# Patient Record
Sex: FEMALE | Race: WHITE | Employment: FULL TIME | URBAN - METROPOLITAN AREA
[De-identification: names, ages, dates, MRNs, and addresses within clinical notes are randomized per-mention and may not be internally consistent; named-entity substitution may affect disease eponyms.]

---

## 2019-04-19 ENCOUNTER — HOSPITAL ENCOUNTER (EMERGENCY)
Age: 54
Discharge: HOME | End: 2019-04-20
Attending: EMERGENCY MEDICINE
Payer: SELF-PAY

## 2019-04-19 DIAGNOSIS — R31.9 HEMATURIA, UNSPECIFIED TYPE: ICD-10-CM

## 2019-04-19 DIAGNOSIS — R10.11 ABDOMINAL PAIN, RIGHT UPPER QUADRANT: ICD-10-CM

## 2019-04-19 DIAGNOSIS — R10.9 FLANK PAIN: Primary | ICD-10-CM

## 2019-04-19 LAB
ALBUMIN SERPL-MCNC: 3.7 G/DL (ref 3.5–5)
ALBUMIN/GLOB SERPL: 1 {RATIO}
ALP SERPL-CCNC: 64 U/L (ref 50–130)
ALT SERPL-CCNC: 16 U/L (ref 12–65)
ANION GAP SERPL CALC-SCNC: 8 MMOL/L
APPEARANCE UR: CLEAR
AST SERPL-CCNC: 15 U/L (ref 15–37)
BACTERIA URNS QL MICRO: 0 /HPF
BASOPHILS # BLD: 0 K/UL (ref 0–0.2)
BASOPHILS NFR BLD: 0 % (ref 0–2)
BILIRUB SERPL-MCNC: 0.7 MG/DL (ref 0.2–1.1)
BILIRUB UR QL: ABNORMAL
BUN SERPL-MCNC: 13 MG/DL (ref 6–23)
CALCIUM SERPL-MCNC: 9 MG/DL (ref 8.3–10.4)
CHLORIDE SERPL-SCNC: 105 MMOL/L (ref 98–107)
CO2 SERPL-SCNC: 24 MMOL/L (ref 21–32)
COLOR UR: YELLOW
CREAT SERPL-MCNC: 0.91 MG/DL (ref 0.6–1)
DIFFERENTIAL METHOD BLD: ABNORMAL
EOSINOPHIL # BLD: 0 K/UL (ref 0–0.8)
EOSINOPHIL NFR BLD: 0 % (ref 0.5–7.8)
EPI CELLS #/AREA URNS HPF: ABNORMAL /HPF
ERYTHROCYTE [DISTWIDTH] IN BLOOD BY AUTOMATED COUNT: 13 % (ref 11.9–14.6)
GLOBULIN SER CALC-MCNC: 3.6 G/DL (ref 2.3–3.5)
GLUCOSE SERPL-MCNC: 102 MG/DL (ref 65–100)
GLUCOSE UR STRIP.AUTO-MCNC: NEGATIVE MG/DL
HCT VFR BLD AUTO: 45 % (ref 35.8–46.3)
HGB BLD-MCNC: 15.2 G/DL (ref 11.7–15.4)
HGB UR QL STRIP: ABNORMAL
IMM GRANULOCYTES # BLD AUTO: 0 K/UL (ref 0–0.5)
IMM GRANULOCYTES NFR BLD AUTO: 0 % (ref 0–5)
KETONES UR QL STRIP.AUTO: 15 MG/DL
LEUKOCYTE ESTERASE UR QL STRIP.AUTO: NEGATIVE
LIPASE SERPL-CCNC: 113 U/L (ref 73–393)
LYMPHOCYTES # BLD: 1.6 K/UL (ref 0.5–4.6)
LYMPHOCYTES NFR BLD: 16 % (ref 13–44)
MCH RBC QN AUTO: 30.6 PG (ref 26.1–32.9)
MCHC RBC AUTO-ENTMCNC: 33.8 G/DL (ref 31.4–35)
MCV RBC AUTO: 90.5 FL (ref 79.6–97.8)
MONOCYTES # BLD: 1 K/UL (ref 0.1–1.3)
MONOCYTES NFR BLD: 10 % (ref 4–12)
NEUTS SEG # BLD: 7.3 K/UL (ref 1.7–8.2)
NEUTS SEG NFR BLD: 73 % (ref 43–78)
NITRITE UR QL STRIP.AUTO: NEGATIVE
NRBC # BLD: 0 K/UL (ref 0–0.2)
PH UR STRIP: 5.5 [PH] (ref 5–9)
PLATELET # BLD AUTO: 295 K/UL (ref 150–450)
PMV BLD AUTO: 9.5 FL (ref 9.4–12.3)
POTASSIUM SERPL-SCNC: 4 MMOL/L (ref 3.5–5.1)
PROT SERPL-MCNC: 7.3 G/DL
PROT UR STRIP-MCNC: 30 MG/DL
RBC # BLD AUTO: 4.97 M/UL (ref 4.05–5.2)
RBC #/AREA URNS HPF: ABNORMAL /HPF
SODIUM SERPL-SCNC: 137 MMOL/L (ref 136–145)
SP GR UR REFRACTOMETRY: 1.02 (ref 1–1.02)
UROBILINOGEN UR QL STRIP.AUTO: 0.2 EU/DL (ref 0.2–1)
WBC # BLD AUTO: 9.9 K/UL (ref 4.3–11.1)

## 2019-04-19 PROCEDURE — 93005 ELECTROCARDIOGRAM TRACING: CPT | Performed by: EMERGENCY MEDICINE

## 2019-04-19 PROCEDURE — 96374 THER/PROPH/DIAG INJ IV PUSH: CPT | Performed by: EMERGENCY MEDICINE

## 2019-04-19 PROCEDURE — 80053 COMPREHEN METABOLIC PANEL: CPT

## 2019-04-19 PROCEDURE — 83690 ASSAY OF LIPASE: CPT

## 2019-04-19 PROCEDURE — 74011250636 HC RX REV CODE- 250/636: Performed by: EMERGENCY MEDICINE

## 2019-04-19 PROCEDURE — 87086 URINE CULTURE/COLONY COUNT: CPT

## 2019-04-19 PROCEDURE — 96361 HYDRATE IV INFUSION ADD-ON: CPT | Performed by: EMERGENCY MEDICINE

## 2019-04-19 PROCEDURE — 81003 URINALYSIS AUTO W/O SCOPE: CPT | Performed by: EMERGENCY MEDICINE

## 2019-04-19 PROCEDURE — 99285 EMERGENCY DEPT VISIT HI MDM: CPT | Performed by: EMERGENCY MEDICINE

## 2019-04-19 PROCEDURE — 74011250637 HC RX REV CODE- 250/637: Performed by: EMERGENCY MEDICINE

## 2019-04-19 PROCEDURE — 87186 SC STD MICRODIL/AGAR DIL: CPT

## 2019-04-19 PROCEDURE — 87088 URINE BACTERIA CULTURE: CPT

## 2019-04-19 PROCEDURE — 81003 URINALYSIS AUTO W/O SCOPE: CPT

## 2019-04-19 PROCEDURE — 85025 COMPLETE CBC W/AUTO DIFF WBC: CPT

## 2019-04-19 RX ORDER — ACETAMINOPHEN 500 MG
1000 TABLET ORAL
Status: COMPLETED | OUTPATIENT
Start: 2019-04-19 | End: 2019-04-19

## 2019-04-19 RX ORDER — MORPHINE SULFATE 2 MG/ML
4 INJECTION, SOLUTION INTRAMUSCULAR; INTRAVENOUS ONCE
Status: COMPLETED | OUTPATIENT
Start: 2019-04-19 | End: 2019-04-19

## 2019-04-19 RX ADMIN — MORPHINE SULFATE 4 MG: 2 INJECTION, SOLUTION INTRAMUSCULAR; INTRAVENOUS at 23:29

## 2019-04-19 RX ADMIN — ACETAMINOPHEN 1000 MG: 500 TABLET, FILM COATED ORAL at 23:28

## 2019-04-19 RX ADMIN — SODIUM CHLORIDE 1000 ML: 900 INJECTION, SOLUTION INTRAVENOUS at 23:28

## 2019-04-19 NOTE — LETTER
[facility] EMERGENCY DEPT 
[address] [place] [id number] [phone] Work/School Note Date: 4/19/2019 To Whom It May concern: 
 
Bang Husbands was seen and treated today in the emergency room by the following provider(s): 
Attending Provider: Olena Lagunas MD. Bang Husbands may return to work on Tuesday 4/23/19 Sincerely, Lindsay Vásquez RN

## 2019-04-20 ENCOUNTER — APPOINTMENT (OUTPATIENT)
Dept: ULTRASOUND IMAGING | Age: 54
End: 2019-04-20
Attending: EMERGENCY MEDICINE
Payer: SELF-PAY

## 2019-04-20 ENCOUNTER — APPOINTMENT (OUTPATIENT)
Dept: CT IMAGING | Age: 54
End: 2019-04-20
Attending: EMERGENCY MEDICINE
Payer: SELF-PAY

## 2019-04-20 VITALS
HEIGHT: 65 IN | SYSTOLIC BLOOD PRESSURE: 100 MMHG | TEMPERATURE: 98.7 F | HEART RATE: 77 BPM | BODY MASS INDEX: 25.83 KG/M2 | WEIGHT: 155 LBS | OXYGEN SATURATION: 95 % | RESPIRATION RATE: 18 BRPM | DIASTOLIC BLOOD PRESSURE: 58 MMHG

## 2019-04-20 LAB
ATRIAL RATE: 92 BPM
CALCULATED P AXIS, ECG09: 78 DEGREES
CALCULATED R AXIS, ECG10: 72 DEGREES
CALCULATED T AXIS, ECG11: 85 DEGREES
DIAGNOSIS, 93000: NORMAL
P-R INTERVAL, ECG05: 132 MS
Q-T INTERVAL, ECG07: 326 MS
QRS DURATION, ECG06: 64 MS
QTC CALCULATION (BEZET), ECG08: 403 MS
VENTRICULAR RATE, ECG03: 92 BPM

## 2019-04-20 PROCEDURE — 76705 ECHO EXAM OF ABDOMEN: CPT

## 2019-04-20 PROCEDURE — 74011250636 HC RX REV CODE- 250/636: Performed by: EMERGENCY MEDICINE

## 2019-04-20 PROCEDURE — 96375 TX/PRO/DX INJ NEW DRUG ADDON: CPT | Performed by: EMERGENCY MEDICINE

## 2019-04-20 PROCEDURE — 96361 HYDRATE IV INFUSION ADD-ON: CPT | Performed by: EMERGENCY MEDICINE

## 2019-04-20 PROCEDURE — 74176 CT ABD & PELVIS W/O CONTRAST: CPT

## 2019-04-20 RX ORDER — OXYCODONE AND ACETAMINOPHEN 5; 325 MG/1; MG/1
1 TABLET ORAL
Qty: 20 TAB | Refills: 0 | Status: SHIPPED | OUTPATIENT
Start: 2019-04-20 | End: 2019-04-23

## 2019-04-20 RX ORDER — KETOROLAC TROMETHAMINE 30 MG/ML
30 INJECTION, SOLUTION INTRAMUSCULAR; INTRAVENOUS ONCE
Status: COMPLETED | OUTPATIENT
Start: 2019-04-20 | End: 2019-04-20

## 2019-04-20 RX ADMIN — KETOROLAC TROMETHAMINE 30 MG: 30 INJECTION, SOLUTION INTRAMUSCULAR at 01:01

## 2019-04-20 NOTE — ED TRIAGE NOTES
Pt states that she woke this am with severe right flank pain and pain into her esophagus area. Also, c/o nausea

## 2019-04-20 NOTE — DISCHARGE INSTRUCTIONS
Patient Education        Blood in the Urine: Care Instructions  Your Care Instructions    Blood in the urine, or hematuria, may make the urine look red, brown, or pink. There may be blood every time you urinate or just from time to time. You cannot always see blood in the urine, but it will show up in a urine test.  Blood in the urine may be serious. It should always be checked by a doctor. Your doctor may recommend more tests, including an X-ray, a CT scan, or a cystoscopy (which lets a doctor look inside the urethra and bladder). Blood in the urine can be a sign of another problem. Common causes are bladder infections and kidney stones. An injury to your groin or your genital area can also cause bleeding in the urinary tract. Very hard exercise--such as running a marathon--can cause blood in the urine. Blood in the urine can also be a sign of kidney disease or cancer in the bladder or kidney. Many cases of blood in the urine are caused by a harmless condition that runs in families. This is called benign familial hematuria. It does not need any treatment. Sometimes your urine may look red or brown even though it does not contain blood. For example, not getting enough fluids (dehydration), taking certain medicines, or having a liver problem can change the color of your urine. Eating foods such as beets, rhubarb, or blackberries or foods with red food coloring can make your urine look red or pink. Follow-up care is a key part of your treatment and safety. Be sure to make and go to all appointments, and call your doctor if you are having problems. It's also a good idea to know your test results and keep a list of the medicines you take. When should you call for help? Call your doctor now or seek immediate medical care if:    · You have symptoms of a urinary infection. For example:  ? You have pus in your urine. ? You have pain in your back just below your rib cage. This is called flank pain. ?  You have a fever, chills, or body aches. ? It hurts to urinate. ? You have groin or belly pain.     · You have more blood in your urine.    Watch closely for changes in your health, and be sure to contact your doctor if:    · You have new urination problems.     · You do not get better as expected. Where can you learn more? Go to http://jesica-sheree.info/. Enter T525 in the search box to learn more about \"Blood in the Urine: Care Instructions. \"  Current as of: March 20, 2018  Content Version: 11.9  © 5101-0211 [employer]. Care instructions adapted under license by [employer] (which disclaims liability or warranty for this information). If you have questions about a medical condition or this instruction, always ask your healthcare professional. [address] any warranty or liability for your use of this information. Use the pain medication as needed. Continue to monitor symptoms she develop high fevers, uncontrolled vomiting or pain please return for further evaluation. Patient Education        Abdominal Pain: Care Instructions  Your Care Instructions    Abdominal pain has many possible causes. Some aren't serious and get better on their own in a few days. Others need more testing and treatment. If your pain continues or gets worse, you need to be rechecked and may need more tests to find out what is wrong. You may need surgery to correct the problem. Don't ignore new symptoms, such as fever, nausea and vomiting, urination problems, pain that gets worse, and dizziness. These may be signs of a more serious problem. Your doctor may have recommended a follow-up visit in the next 8 to 12 hours. If you are not getting better, you may need more tests or treatment. The doctor has checked you carefully, but problems can develop later. If you notice any problems or new symptoms, get medical treatment right away.   Follow-up care is a key part of your treatment and safety. Be sure to make and go to all appointments, and call your doctor if you are having problems. It's also a good idea to know your test results and keep a list of the medicines you take. How can you care for yourself at home? · Rest until you feel better. · To prevent dehydration, drink plenty of fluids, enough so that your urine is light yellow or clear like water. Choose water and other caffeine-free clear liquids until you feel better. If you have kidney, heart, or liver disease and have to limit fluids, talk with your doctor before you increase the amount of fluids you drink. · If your stomach is upset, eat mild foods, such as rice, dry toast or crackers, bananas, and applesauce. Try eating several small meals instead of two or three large ones. · Wait until 48 hours after all symptoms have gone away before you have spicy foods, alcohol, and drinks that contain caffeine. · Do not eat foods that are high in fat. · Avoid anti-inflammatory medicines such as aspirin, ibuprofen (Advil, Motrin), and naproxen (Aleve). These can cause stomach upset. Talk to your doctor if you take daily aspirin for another health problem. When should you call for help? Call 911 anytime you think you may need emergency care. For example, call if:    · You passed out (lost consciousness).     · You pass maroon or very bloody stools.     · You vomit blood or what looks like coffee grounds.     · You have new, severe belly pain.    Call your doctor now or seek immediate medical care if:    · Your pain gets worse, especially if it becomes focused in one area of your belly.     · You have a new or higher fever.     · Your stools are black and look like tar, or they have streaks of blood.     · You have unexpected vaginal bleeding.     · You have symptoms of a urinary tract infection. These may include:  ? Pain when you urinate. ? Urinating more often than usual.  ?  Blood in your urine.     · You are dizzy or lightheaded, or you feel like you may faint.    Watch closely for changes in your health, and be sure to contact your doctor if:    · You are not getting better after 1 day (24 hours). Where can you learn more? Go to http://jesica-sheree.info/. Enter P470 in the search box to learn more about \"Abdominal Pain: Care Instructions. \"  Current as of: September 23, 2018  Content Version: 11.9  © 6075-5236 [employer]. Care instructions adapted under license by [employer] (which disclaims liability or warranty for this information). If you have questions about a medical condition or this instruction, always ask your healthcare professional. [address] any warranty or liability for your use of this information.

## 2019-04-20 NOTE — ED NOTES
I have reviewed discharge instructions with the patient. The patient verbalized understanding. Patient left ED via Discharge Method: ambulatory to Home with . Opportunity for questions and clarification provided. Patient given 1 scripts. To continue your aftercare when you leave the hospital, you may receive an automated call from our care team to check in on how you are doing. This is a free service and part of our promise to provide the best care and service to meet your aftercare needs.  If you have questions, or wish to unsubscribe from this service please call [phone]. Thank you for Choosing our [facility] Emergency Department.

## 2019-04-22 LAB
BACTERIA SPEC CULT: ABNORMAL
BACTERIA SPEC CULT: ABNORMAL
SERVICE CMNT-IMP: ABNORMAL

## 2019-04-22 RX ORDER — CIPROFLOXACIN 500 MG/1
500 TABLET ORAL 2 TIMES DAILY
Qty: 14 TAB | Refills: 0 | Status: SHIPPED | OUTPATIENT
Start: 2019-04-22 | End: 2019-04-29

## 2019-04-22 NOTE — PROGRESS NOTES
Spoke with patient who has a follow up with urology scheduled for Monday. She states she has continued to have pain. I have discussed need for antibiotics with her. She voiced understanding. Prescription for cipro sent to pharmacy of her choice.

## 2019-04-25 NOTE — ED PROVIDER NOTES
The history is provided by the patient. Epigastric Pain This is a new problem. The current episode started 12 to 24 hours ago. The problem occurs constantly. The problem has not changed since onset. The pain is associated with an unknown factor. The pain is located in the epigastric region. The quality of the pain is cramping, pressure-like and shooting. The pain is at a severity of 5/10. The pain is moderate. Associated symptoms include nausea. Pertinent negatives include no anorexia, no fever, no belching, no diarrhea, no flatus, no hematochezia, no melena, no vomiting, no constipation, no dysuria, no frequency, no hematuria, no headaches, no arthralgias, no myalgias, no trauma, no chest pain, no testicular pain and no back pain. Nothing worsens the pain. The pain is relieved by nothing. History reviewed. No pertinent past medical history. Past Surgical History:  
Procedure Laterality Date  HX BACK SURGERY    
 HX GYN    
 HX HYSTERECTOMY  HX ORTHOPAEDIC History reviewed. No pertinent family history. Social History Socioeconomic History  Marital status:  Spouse name: Not on file  Number of children: Not on file  Years of education: Not on file  Highest education level: Not on file Occupational History  Not on file Social Needs  Financial resource strain: Not on file  Food insecurity:  
  Worry: Not on file Inability: Not on file  Transportation needs:  
  Medical: Not on file Non-medical: Not on file Tobacco Use  Smoking status: Current Every Day Smoker  Smokeless tobacco: Never Used Substance and Sexual Activity  Alcohol use: Yes Comment: occassionally  Drug use: Not Currently  Sexual activity: Yes  
  Partners: Male Lifestyle  Physical activity:  
  Days per week: Not on file Minutes per session: Not on file  Stress: Not on file Relationships  Social connections: Talks on phone: Not on file Gets together: Not on file Attends Mandaeism service: Not on file Active member of club or organization: Not on file Attends meetings of clubs or organizations: Not on file Relationship status: Not on file  Intimate partner violence:  
  Fear of current or ex partner: Not on file Emotionally abused: Not on file Physically abused: Not on file Forced sexual activity: Not on file Other Topics Concern  Not on file Social History Narrative  Not on file ALLERGIES: Patient has no known allergies. Review of Systems Constitutional: Negative for fever. Cardiovascular: Negative for chest pain. Gastrointestinal: Positive for nausea. Negative for anorexia, constipation, diarrhea, flatus, hematochezia, melena and vomiting. Genitourinary: Negative for dysuria, frequency, hematuria and testicular pain. Musculoskeletal: Negative for arthralgias, back pain and myalgias. Neurological: Negative for headaches. All other systems reviewed and are negative. Vitals:  
 04/20/19 0000 04/20/19 0123 04/20/19 0124 04/20/19 0215 BP: 106/63 103/57  100/58 Pulse: 88  75 77 Resp: 17   18 Temp:    98.7 °F (37.1 °C) SpO2: 93%  93% 95% Weight:      
Height:      
      
 
Physical Exam  
Constitutional: She is oriented to person, place, and time. She appears well-developed and well-nourished. HENT:  
Head: Normocephalic and atraumatic. Eyes: Pupils are equal, round, and reactive to light. Conjunctivae are normal.  
Neck: Neck supple. Cardiovascular: Normal rate and regular rhythm. Pulmonary/Chest: Effort normal and breath sounds normal.  
Abdominal: Soft. Bowel sounds are normal. There is tenderness. Musculoskeletal: Normal range of motion. Neurological: She is alert and oriented to person, place, and time. Skin: Skin is warm and dry. Nursing note and vitals reviewed. MDM Number of Diagnoses or Management Options Abdominal pain, right upper quadrant:  
Flank pain:  
Hematuria, unspecified type:  
Diagnosis management comments: 19-year-old female presenting for epigastric pain. Differential includes cholelithiasis, cholecystitis, kidney stone, urinary tract infection, constipation, peptic ulcer disease Amount and/or Complexity of Data Reviewed Clinical lab tests: ordered and reviewed Tests in the radiology section of CPT®: ordered and reviewed Tests in the medicine section of CPT®: ordered and reviewed Independent visualization of images, tracings, or specimens: yes Risk of Complications, Morbidity, and/or Mortality Presenting problems: moderate Diagnostic procedures: moderate Management options: moderate General comments: I personally reviewed the patient's vital signs, laboratory tests, and/or radiological findings. I discussed these findings with the patient and their significance. I answered all questions and gave the patient clear return precautions. The patient was discharged from the emergency department in stable condition Patient Progress Patient progress: stable ED Course as of Apr 25 1438 Sat Apr 20, 2019  
0012 There is large blood and patient's urinalysis consistent with possible kidney stone. [JS] 0013 No bacteria, no leuk esterase and no nitrates [JS] ED Course User Index [JS] Abbey Hinson MD  
 
 
Procedures

## 2019-11-19 ENCOUNTER — APPOINTMENT (OUTPATIENT)
Dept: GENERAL RADIOLOGY | Age: 54
End: 2019-11-19
Attending: STUDENT IN AN ORGANIZED HEALTH CARE EDUCATION/TRAINING PROGRAM
Payer: COMMERCIAL

## 2019-11-19 ENCOUNTER — HOSPITAL ENCOUNTER (EMERGENCY)
Age: 54
Discharge: HOME | End: 2019-11-19
Attending: STUDENT IN AN ORGANIZED HEALTH CARE EDUCATION/TRAINING PROGRAM
Payer: COMMERCIAL

## 2019-11-19 VITALS
HEART RATE: 84 BPM | SYSTOLIC BLOOD PRESSURE: 106 MMHG | DIASTOLIC BLOOD PRESSURE: 76 MMHG | OXYGEN SATURATION: 98 % | TEMPERATURE: 98.4 F | RESPIRATION RATE: 18 BRPM

## 2019-11-19 DIAGNOSIS — J06.9 ACUTE UPPER RESPIRATORY INFECTION: Primary | ICD-10-CM

## 2019-11-19 DIAGNOSIS — M54.50 ACUTE MIDLINE LOW BACK PAIN WITHOUT SCIATICA: ICD-10-CM

## 2019-11-19 LAB
BACTERIA URNS QL MICRO: 0 /HPF
CASTS URNS QL MICRO: NORMAL /LPF
EPI CELLS #/AREA URNS HPF: NORMAL /HPF
RBC #/AREA URNS HPF: NORMAL /HPF
WBC URNS QL MICRO: NORMAL /HPF

## 2019-11-19 PROCEDURE — 81003 URINALYSIS AUTO W/O SCOPE: CPT | Performed by: STUDENT IN AN ORGANIZED HEALTH CARE EDUCATION/TRAINING PROGRAM

## 2019-11-19 PROCEDURE — 81015 MICROSCOPIC EXAM OF URINE: CPT

## 2019-11-19 PROCEDURE — 96372 THER/PROPH/DIAG INJ SC/IM: CPT | Performed by: STUDENT IN AN ORGANIZED HEALTH CARE EDUCATION/TRAINING PROGRAM

## 2019-11-19 PROCEDURE — 74011250636 HC RX REV CODE- 250/636: Performed by: STUDENT IN AN ORGANIZED HEALTH CARE EDUCATION/TRAINING PROGRAM

## 2019-11-19 PROCEDURE — 72100 X-RAY EXAM L-S SPINE 2/3 VWS: CPT

## 2019-11-19 PROCEDURE — 71046 X-RAY EXAM CHEST 2 VIEWS: CPT

## 2019-11-19 PROCEDURE — 74011250637 HC RX REV CODE- 250/637: Performed by: STUDENT IN AN ORGANIZED HEALTH CARE EDUCATION/TRAINING PROGRAM

## 2019-11-19 PROCEDURE — 99284 EMERGENCY DEPT VISIT MOD MDM: CPT | Performed by: STUDENT IN AN ORGANIZED HEALTH CARE EDUCATION/TRAINING PROGRAM

## 2019-11-19 RX ORDER — HYDROCODONE BITARTRATE AND ACETAMINOPHEN 7.5; 325 MG/1; MG/1
1 TABLET ORAL
Qty: 10 TAB | Refills: 0 | Status: SHIPPED | OUTPATIENT
Start: 2019-11-19 | End: 2019-11-23

## 2019-11-19 RX ORDER — DOXYCYCLINE HYCLATE 100 MG
100 TABLET ORAL 2 TIMES DAILY
Qty: 20 TAB | Refills: 0 | Status: SHIPPED | OUTPATIENT
Start: 2019-11-19 | End: 2019-11-29

## 2019-11-19 RX ORDER — DEXAMETHASONE 2 MG/1
TABLET ORAL
Qty: 4 TAB | Refills: 0 | Status: SHIPPED | OUTPATIENT
Start: 2019-11-19 | End: 2019-11-23

## 2019-11-19 RX ORDER — IBUPROFEN 800 MG/1
800 TABLET ORAL
Qty: 20 TAB | Refills: 0 | Status: SHIPPED | OUTPATIENT
Start: 2019-11-19 | End: 2019-11-26

## 2019-11-19 RX ORDER — HYDROMORPHONE HYDROCHLORIDE 1 MG/ML
1 INJECTION, SOLUTION INTRAMUSCULAR; INTRAVENOUS; SUBCUTANEOUS
Status: COMPLETED | OUTPATIENT
Start: 2019-11-19 | End: 2019-11-19

## 2019-11-19 RX ORDER — DEXAMETHASONE SODIUM PHOSPHATE 100 MG/10ML
10 INJECTION INTRAMUSCULAR; INTRAVENOUS
Status: COMPLETED | OUTPATIENT
Start: 2019-11-19 | End: 2019-11-19

## 2019-11-19 RX ORDER — ONDANSETRON 4 MG/1
4 TABLET, ORALLY DISINTEGRATING ORAL
Status: COMPLETED | OUTPATIENT
Start: 2019-11-19 | End: 2019-11-19

## 2019-11-19 RX ORDER — HYDROCODONE BITARTRATE AND ACETAMINOPHEN 7.5; 325 MG/1; MG/1
1 TABLET ORAL
Status: COMPLETED | OUTPATIENT
Start: 2019-11-19 | End: 2019-11-19

## 2019-11-19 RX ADMIN — DEXAMETHASONE SODIUM PHOSPHATE 10 MG: 10 INJECTION INTRAMUSCULAR; INTRAVENOUS at 21:31

## 2019-11-19 RX ADMIN — HYDROCODONE BITARTRATE AND ACETAMINOPHEN 1 TABLET: 7.5; 325 TABLET ORAL at 22:55

## 2019-11-19 RX ADMIN — HYDROMORPHONE HYDROCHLORIDE 1 MG: 1 INJECTION, SOLUTION INTRAMUSCULAR; INTRAVENOUS; SUBCUTANEOUS at 21:31

## 2019-11-19 RX ADMIN — ONDANSETRON 4 MG: 4 TABLET, ORALLY DISINTEGRATING ORAL at 21:31

## 2019-11-20 NOTE — ED NOTES
Patient reports some relief of discomfort, states pain still at 7 on 1-10 scale with movement. Otherwise, no further needs expressed or apparent.

## 2019-11-20 NOTE — ED PROVIDER NOTES
19-year-old female patient with a history of chronic back pain presents to the emergency department with reports of sudden pain probation. Patient states she is suffered from an upper respiratory infection/congestion for the past several days. This is made her start to cough. She states she coughed forcefully yesterday and had sudden onset of discomfort in her lower back. Pain is isolated to the lumbar spine and does not radiate. She denies tingling, numbness or weakness. Her movement is limited significantly secondary to pain. She reports normal bowel and bladder habits however. No associated fever or chills. Denies known sick contacts.            Past Medical History:   Diagnosis Date    Kidney stone        Past Surgical History:   Procedure Laterality Date    HX BACK SURGERY      HX GYN      HX HYSTERECTOMY      HX ORTHOPAEDIC           Family History:   Problem Relation Age of Onset    Cancer Mother     Cancer Father        Social History     Socioeconomic History    Marital status:      Spouse name: Not on file    Number of children: Not on file    Years of education: Not on file    Highest education level: Not on file   Occupational History    Not on file   Social Needs    Financial resource strain: Not on file    Food insecurity:     Worry: Not on file     Inability: Not on file    Transportation needs:     Medical: Not on file     Non-medical: Not on file   Tobacco Use    Smoking status: Current Every Day Smoker     Packs/day: 0.50    Smokeless tobacco: Never Used   Substance and Sexual Activity    Alcohol use: Not Currently     Comment: occassionally    Drug use: Not Currently    Sexual activity: Yes     Partners: Male   Lifestyle    Physical activity:     Days per week: Not on file     Minutes per session: Not on file    Stress: Not on file   Relationships    Social connections:     Talks on phone: Not on file     Gets together: Not on file     Attends Sikhism service: Not on file     Active member of club or organization: Not on file     Attends meetings of clubs or organizations: Not on file     Relationship status: Not on file    Intimate partner violence:     Fear of current or ex partner: Not on file     Emotionally abused: Not on file     Physically abused: Not on file     Forced sexual activity: Not on file   Other Topics Concern    Not on file   Social History Narrative    Not on file         ALLERGIES: Patient has no known allergies. Review of Systems   Constitutional: Negative for chills, diaphoresis and fever. HENT: Positive for rhinorrhea and sinus pressure. Negative for congestion, sneezing and sore throat. Eyes: Negative for visual disturbance. Respiratory: Positive for cough. Negative for chest tightness, shortness of breath and wheezing. Cardiovascular: Negative for chest pain and leg swelling. Gastrointestinal: Negative for abdominal pain, blood in stool, diarrhea, nausea and vomiting. Endocrine: Negative for polyuria. Genitourinary: Negative for difficulty urinating, dysuria, flank pain, hematuria and urgency. Musculoskeletal: Positive for back pain. Negative for myalgias, neck pain and neck stiffness. Skin: Negative for color change and rash. Neurological: Negative for dizziness, syncope, speech difficulty, weakness, light-headedness, numbness and headaches. Psychiatric/Behavioral: Negative for behavioral problems. All other systems reviewed and are negative. Vitals:    11/19/19 1947   BP: (!) 127/94   Pulse: (!) 102   Resp: 20   Temp: 98 °F (36.7 °C)   SpO2: 95%            Physical Exam   Constitutional: She is oriented to person, place, and time. She appears well-developed and well-nourished. No distress. Seated upright, tearful on exam, appears uncomfortable. Alert and oriented to person place and time. No acute distress, speaks in clear, fluid sentences. HENT:   Head: Normocephalic and atraumatic.    Right Ear: External ear normal.   Left Ear: External ear normal.   Nose: Nose normal.   Some fullness to the TMs bilaterally without obvious infection. Posterior oropharynx is clear. Bilateral nasal congestion. Eyes: Pupils are equal, round, and reactive to light. EOM are normal.   Neck: Normal range of motion. Cardiovascular: Normal rate, regular rhythm, normal heart sounds and intact distal pulses. Exam reveals no gallop and no friction rub. No murmur heard. Pulmonary/Chest: Effort normal and breath sounds normal. No stridor. No respiratory distress. She has no decreased breath sounds. She has no wheezes. She has no rhonchi. She has no rales. She exhibits no tenderness. Clear to auscultation, coughs intermittently during exam.   Abdominal: Soft. She exhibits no distension and no mass. There is no tenderness. There is no rebound and no guarding. No hernia. Musculoskeletal: Normal range of motion. She exhibits no edema, tenderness or deformity. No palpable step-off or deformity. There is reproducible pain with palpation of the right lateral musculature in the lumbar spine however. No skin rash or signs of trauma. Neurological: She is alert and oriented to person, place, and time. No cranial nerve deficit. Skin: Skin is warm and dry. She is not diaphoretic. Nursing note and vitals reviewed.        MDM  Number of Diagnoses or Management Options     Amount and/or Complexity of Data Reviewed  Clinical lab tests: ordered and reviewed  Tests in the radiology section of CPT®: ordered and reviewed  Tests in the medicine section of CPT®: ordered and reviewed    Risk of Complications, Morbidity, and/or Mortality  Presenting problems: moderate  Diagnostic procedures: low  Management options: moderate    Patient Progress  Patient progress: stable         Procedures

## 2019-11-20 NOTE — DISCHARGE INSTRUCTIONS
Patient Education   Take the medication for pain as prescribed. Arrange follow-up care with the spinal specialist and primary care provider listed. Return for worsening symptoms, concerns or questions. Learning About Low Back Pain  What is low back pain? Low back pain is pain that can occur anywhere below the ribs and above the legs. It is very common. Almost everyone has it at one time or another. Low back pain can be:  Acute. This is new pain that can last a few days to a few weeks--at the most a few months. Chronic. This pain can last for more than a few months. Sometimes it can last for years. What are some myths about low back pain? Here are some common myths about low back pain--and the facts:  Myth: \"I need to rest my back when I have back pain. \"  Fact: Staying active won't hurt you. It may help you get better faster. Myth: \"I need prescription pain medicine. \"  Fact: It's best to try to let time and being active heal your back. Opioid pain medicines--such as hydrocodone or oxycodone--usually don't work any better than over-the-counter medicines like ibuprofen or naproxen. And opioids can cause serious problems like opioid use disorder or overdose. Moderate to severe opioid use disorder is sometimes called addiction. Myth: \"I need a test like an X-ray or an MRI to diagnose my low back pain. \"  Fact: Getting a test right away won't help you get better faster. And it could lead you down a treatment path you may not need, since most people get better on their own. What causes low back pain? In most cases, there isn't a clear cause. This can be frustrating, because your back hurts and there's no obvious reason. Your back pain can be caused by:  Overuse or muscle strain. This can happen from playing sports, lifting heavy things, or not being physically fit. A herniated disc. This is a problem with the cushion between the bones in your back. Arthritis.    With age, you may have changes in your bones that can narrow the space around your nerves. Other causes. In rare cases, the cause is a serious illness like an infection or cancer. But there are usually other symptoms too. What are the symptoms? Your symptoms depend on your body and the cause of your back pain. You may feel:  · Pain that's sharp or dull. It may be in one small area or over a broad area. But even bad pain doesn't mean that it's caused by something serious. · Leg pain, numbness, or tingling. When a nerve gets squeezed--such as from a disc problem or arthritis--you may have symptoms in your leg or foot. You can even have leg symptoms from a back problem without having any pain in your back. How is low back pain diagnosed? A physical exam is the main way to diagnose low back pain. Your doctor may examine your back, check your nerves by testing your reflexes, and make sure that your muscles are strong. He or she also will ask questions about your back and overall health. Most people don't need any tests right away. Tests often don't show the reason for your pain. If your pain lasts more than 6 weeks or you have symptoms that your doctor is more concerned about, he or she may order tests. These may include an X-ray, a CT scan, or an MRI. In some cases, tests can help your doctor find a cause for your pain, especially for pain in one or both legs. How is low back pain treated? Most acute low back pain gets better on its own within a few weeks, no matter what the cause. Time and doing usual activities are all that most people need to feel better. Using heat or ice and taking over-the-counter pain medicine also can help while your body heals. If you aren't getting better on your own or your pain is very bad, your doctor may recommend:  · Physical therapy. · Spinal manipulation, such as by a chiropractor. · Acupuncture. · Massage. · Injections of steroid medicine in your back (especially for pain that involves your legs).   If you have chronic low back pain, treatment will help you understand and manage your pain. Treatment may include:  · Staying active. This may include walking or doing back exercises. · Physical therapy. · Medicines. Some of these medicines are also used for other problems, like depression. · Pain management. Your doctor may have you see a pain specialist.  · Counseling. Having chronic pain can be hard. It may help to talk to someone who can help you cope with your pain. Surgery isn't needed for most people. But it may help some types of low back pain. Follow-up care is a key part of your treatment and safety. Be sure to make and go to all appointments, and call your doctor if you are having problems. It's also a good idea to know your test results and keep a list of the medicines you take. When should you call for help? Call  911 anytime you think you may need emergency care. For example, call if:  · You can't move a leg at all. Call your doctor now or seek immediate medical care if:  · You have new or worse symptoms in your legs, belly, or buttocks. Symptoms may include:  ? Numbness or tingling. ? Weakness. ? Pain. · You lose bladder or bowel control. Watch closely for changes in your health, and be sure to contact your doctor if:  · Along with the back pain, you have a fever, lose weight, or don't feel well. · You do not get better as expected. Where can you learn more? Go to http://jesica-sheree.info/. Enter A007 in the search box to learn more about \"Learning About Low Back Pain. \"  Current as of: June 26, 2019  Content Version: 12.2  © 0247-0019 [employer]. Care instructions adapted under license by [employer] (which disclaims liability or warranty for this information).  If you have questions about a medical condition or this instruction, always ask your healthcare professional. [place] [address] any warranty or liability for your use of this information.

## 2019-11-20 NOTE — ED NOTES
I have reviewed discharge instructions with the patient. The patient verbalized understanding. Patient to follow up with PMD, neurosurg as referred and RTED with any changes/concerns. Patient expresses understanding. Patient ambulatory (with me,  as ) per patient request with Rx x 4. Patient advised that they received medications (either in ED or by Rx) which could cause them to be somnolent. Patient advised that they shouldn't drive or operate machinery and should use caution to avoid falls while under the effects (8-12 hours after last dosage) of said medicine. Patient affirms she'll use caution with her narcotic Rx. Patient's  to drive her home.

## 2019-11-20 NOTE — ED TRIAGE NOTES
Pt has a hx of back surgery in 2008. States she's had a cold with a cough this week and now has extreme pain in lower back.

## 2019-11-23 ENCOUNTER — HOSPITAL ENCOUNTER (EMERGENCY)
Age: 54
Discharge: HOME | End: 2019-11-23
Attending: EMERGENCY MEDICINE
Payer: COMMERCIAL

## 2019-11-23 VITALS
OXYGEN SATURATION: 95 % | DIASTOLIC BLOOD PRESSURE: 77 MMHG | SYSTOLIC BLOOD PRESSURE: 126 MMHG | TEMPERATURE: 98.2 F | HEART RATE: 74 BPM | BODY MASS INDEX: 21.66 KG/M2 | HEIGHT: 66 IN | RESPIRATION RATE: 16 BRPM | WEIGHT: 134.8 LBS

## 2019-11-23 DIAGNOSIS — M54.50 CHRONIC MIDLINE LOW BACK PAIN WITHOUT SCIATICA: Primary | ICD-10-CM

## 2019-11-23 DIAGNOSIS — M54.50 ACUTE MIDLINE LOW BACK PAIN WITHOUT SCIATICA: ICD-10-CM

## 2019-11-23 DIAGNOSIS — G89.29 CHRONIC MIDLINE LOW BACK PAIN WITHOUT SCIATICA: Primary | ICD-10-CM

## 2019-11-23 PROCEDURE — 74011250636 HC RX REV CODE- 250/636: Performed by: EMERGENCY MEDICINE

## 2019-11-23 PROCEDURE — 96372 THER/PROPH/DIAG INJ SC/IM: CPT | Performed by: EMERGENCY MEDICINE

## 2019-11-23 PROCEDURE — 99283 EMERGENCY DEPT VISIT LOW MDM: CPT | Performed by: EMERGENCY MEDICINE

## 2019-11-23 RX ORDER — KETOROLAC TROMETHAMINE 30 MG/ML
30 INJECTION, SOLUTION INTRAMUSCULAR; INTRAVENOUS
Status: COMPLETED | OUTPATIENT
Start: 2019-11-23 | End: 2019-11-23

## 2019-11-23 RX ORDER — HYDROCODONE BITARTRATE AND ACETAMINOPHEN 7.5; 325 MG/1; MG/1
1 TABLET ORAL
Qty: 10 TAB | Refills: 0 | Status: SHIPPED | OUTPATIENT
Start: 2019-11-23 | End: 2019-11-26

## 2019-11-23 RX ADMIN — KETOROLAC TROMETHAMINE 30 MG: 30 INJECTION, SOLUTION INTRAMUSCULAR at 22:29

## 2019-11-23 NOTE — LETTER
[address] [address] EMERGENCY DEPT 
[address] [place] [address] 
[phone] Work/School Note Date: 11/23/2019 To Whom It May concern: 
 
Manny Wiley was seen and treated today in the emergency room by the following provider(s): 
Attending Provider: Oliver Plunkett MD. Manny Wiley is excused from work on 11/23/2019-11/25/2019 Sincerely, 
 
 
 
 
Marcell Mcdowell MD

## 2019-11-24 NOTE — ED PROVIDER NOTES
Dell Luke is a 53 y.o. female who presents to the ED with a chief complaint of back pain. She has had problems with this for years and had surgery 3 years ago for after surgery she was doing better but still occasionally gets a flareup. The pain is in the middle of the lumbar region and does not radiate into the legs no urinary incontinence or or numbness or weakness. She did have x-rays that showed no acute abnormalities several days ago when she was seen in the ED. She had trouble getting in closely to see any new doctors and was not feeling any better.            Past Medical History:   Diagnosis Date    Kidney stone        Past Surgical History:   Procedure Laterality Date    HX BACK SURGERY      HX GYN      HX HYSTERECTOMY      HX ORTHOPAEDIC           Family History:   Problem Relation Age of Onset    Cancer Mother     Cancer Father        Social History     Socioeconomic History    Marital status:      Spouse name: Not on file    Number of children: Not on file    Years of education: Not on file    Highest education level: Not on file   Occupational History    Not on file   Social Needs    Financial resource strain: Not on file    Food insecurity:     Worry: Not on file     Inability: Not on file    Transportation needs:     Medical: Not on file     Non-medical: Not on file   Tobacco Use    Smoking status: Current Every Day Smoker     Packs/day: 0.50    Smokeless tobacco: Never Used   Substance and Sexual Activity    Alcohol use: Not Currently     Comment: occassionally    Drug use: Not Currently    Sexual activity: Yes     Partners: Male   Lifestyle    Physical activity:     Days per week: Not on file     Minutes per session: Not on file    Stress: Not on file   Relationships    Social connections:     Talks on phone: Not on file     Gets together: Not on file     Attends Judaism service: Not on file     Active member of club or organization: Not on file     Attends meetings of clubs or organizations: Not on file     Relationship status: Not on file    Intimate partner violence:     Fear of current or ex partner: Not on file     Emotionally abused: Not on file     Physically abused: Not on file     Forced sexual activity: Not on file   Other Topics Concern    Not on file   Social History Narrative    Not on file         ALLERGIES: Patient has no known allergies. Review of Systems   Constitutional: Negative for chills, fatigue and fever. Respiratory: Negative for chest tightness, shortness of breath, wheezing and stridor. Cardiovascular: Negative for chest pain and palpitations. Gastrointestinal: Negative for abdominal pain, diarrhea, nausea and vomiting. Musculoskeletal: Negative for arthralgias, neck pain and neck stiffness. Skin: Negative for color change, rash and wound. All other systems reviewed and are negative. Vitals:    11/23/19 2201 11/23/19 2211   BP: 101/72    Pulse: 74    Resp: 16    Temp: 98.2 °F (36.8 °C)    SpO2: 94% 94%   Weight: 61.1 kg (134 lb 12.8 oz)    Height: 5' 6\" (1.676 m)             Physical Exam  Vitals signs and nursing note reviewed. Constitutional:       General: She is not in acute distress. Appearance: She is well-developed. She is not ill-appearing, toxic-appearing or diaphoretic. HENT:      Head: Normocephalic and atraumatic. Right Ear: Tympanic membrane normal. There is no impacted cerumen. Left Ear: Tympanic membrane normal. There is no impacted cerumen. Nose: Nose normal. No congestion or rhinorrhea. Eyes:      General: No scleral icterus. Conjunctiva/sclera: Conjunctivae normal.   Neck:      Musculoskeletal: Normal range of motion. Pulmonary:      Effort: Pulmonary effort is normal. No respiratory distress. Breath sounds: No stridor. No wheezing, rhonchi or rales. Chest:      Chest wall: No tenderness. Abdominal:      General: There is no distension. Palpations:  There is no mass. Tenderness: There is no tenderness. There is no guarding. Musculoskeletal:         General: Tenderness (Over the middle lumbar region diffusely. No signs of any infection. 2 old vertical scar seen.) present. No swelling, deformity or signs of injury. Skin:     General: Skin is warm. Capillary Refill: Capillary refill takes less than 2 seconds. Neurological:      General: No focal deficit present. Mental Status: She is alert and oriented to person, place, and time. Mental status is at baseline. Psychiatric:         Behavior: Behavior normal.          MDM  Number of Diagnoses or Management Options  Diagnosis management comments: I will treat patient symptomatically and have her follow-up as an outpatient we will give her number for case management so that she can call to have them assist with outpatient appointments. Patient made aware that narcotics cannot repeatedly be coming from the ED but I will prescribe short course along with NSAIDs as she does seem miserable from it now. Buck Schmidt MD; 11/23/2019 @10:28 PM Voice dictation software was used during the making of this note. This software is not perfect and grammatical and other typographical errors may be present.   This note has not been proofread for errors.  ===================================================================          Amount and/or Complexity of Data Reviewed  Tests in the radiology section of CPT®: reviewed           Procedures

## 2019-11-24 NOTE — ED NOTES
I have reviewed discharge instructions with the patient. The patient verbalized understanding. Patient left ED via Discharge Method: ambulatory to Home with spouse. Opportunity for questions and clarification provided. Patient given 1 scripts. To continue your aftercare when you leave the hospital, you may receive an automated call from our care team to check in on how you are doing. This is a free service and part of our promise to provide the best care and service to meet your aftercare needs.  If you have questions, or wish to unsubscribe from this service please call [phone]. Thank you for Choosing our [address] [place] [address] Emergency Department.

## 2019-11-24 NOTE — DISCHARGE INSTRUCTIONS

## 2019-11-24 NOTE — ED TRIAGE NOTES
Pt is complaining of chronic back pain. She was seen for this on Tuesday and had xrays. She has follow up appt on December 10.

## 2019-11-26 NOTE — PROGRESS NOTES
Consult to SW to assist patient with becoming established with primary care. SW called patient, LVM.      Jose Doll, [address] [facility]    [address] [facility]  [email or web address]

## 2019-12-03 PROBLEM — M17.9 OSTEOARTHRITIS OF KNEE: Status: ACTIVE | Noted: 2019-12-03

## 2019-12-03 PROBLEM — F17.200 SMOKER: Status: ACTIVE | Noted: 2019-12-03

## 2019-12-03 PROBLEM — K21.9 GASTROESOPHAGEAL REFLUX DISEASE: Status: ACTIVE | Noted: 2019-12-03

## 2019-12-03 PROBLEM — E03.9 HYPOTHYROIDISM: Status: ACTIVE | Noted: 2019-12-03

## 2019-12-03 PROBLEM — M85.80 OSTEOPENIA: Status: ACTIVE | Noted: 2019-12-03

## 2019-12-03 PROBLEM — M54.50 LOW BACK PAIN: Status: ACTIVE | Noted: 2019-12-03

## 2019-12-11 ENCOUNTER — HOSPITAL ENCOUNTER (OUTPATIENT)
Dept: PHYSICAL THERAPY | Age: 54
Discharge: HOME | End: 2019-12-11
Payer: COMMERCIAL

## 2019-12-11 DIAGNOSIS — M54.42 CHRONIC MIDLINE LOW BACK PAIN WITH BILATERAL SCIATICA: ICD-10-CM

## 2019-12-11 DIAGNOSIS — M54.41 CHRONIC MIDLINE LOW BACK PAIN WITH BILATERAL SCIATICA: ICD-10-CM

## 2019-12-11 DIAGNOSIS — G89.29 CHRONIC MIDLINE LOW BACK PAIN WITH BILATERAL SCIATICA: ICD-10-CM

## 2019-12-11 PROCEDURE — 97110 THERAPEUTIC EXERCISES: CPT

## 2019-12-11 PROCEDURE — 97161 PT EVAL LOW COMPLEX 20 MIN: CPT

## 2019-12-11 NOTE — THERAPY EVALUATION
Sameer Rasmussen : 1965 Payor: Clemente Goodpasture / Plan: [place] [facility] / Product Type: PPO /  Holley Zamoranollor at [facility] [address] [facility], [address], [facility], [address] [facility] [phone]   [phone] OUTPATIENT PHYSICAL THERAPY:Initial Assessment 2019 ICD-10: Treatment Diagnosis: Low back pain (M54.5) Sciatica, left side (M54.32) Muscle weakness (generalized) (M62.81) Difficulty in walking, not elsewhere classified (R26.2) Precautions/Allergies:  
Patient has no known allergies. MD Orders: Eval and Treat  MEDICAL/REFERRING DIAGNOSIS: 
Chronic midline low back pain with bilateral sciatica [M54.41, M54.42, G89.29] DATE OF ONSET: 2019 REFERRING PHYSICIAN: Kristin Carbajal MD 
RETURN PHYSICIAN APPOINTMENT: TBD by patient INITIAL ASSESSMENT:  Ms. [employer] presents to physical therapy with decreased postural and hip/core strength, ROM, joint mobility, flexibility, functional mobility, and increased pain after prolonged coughing from bronchitis per pt report. Pt has limited her self with activity for fear of moving and creating more pain. Pt reports constant pain with sidelying position or supine with slight flexion bias decreasing intensity of pain. No pelvic malalignment upon initial evaluation but muscle spasms and hypertrophy on left > right. [employer] will benefit from skilled physical therapy (medically necessary) to address above deficits affecting participation in basic ADLs and functional mobility/tolerance. Patient will benefit from manual therapeutic techniques (stretching, joint mobilizations, soft tissue mobilization/myofascial release), therapeutic exercises and activities to promote postural control and strengthen, education on body mechanics, and comprehensive home exercises program to address current impairments and functional limitations. PROBLEM LIST (Impacting functional limitations): 1. Decreased Strength 2. Decreased ADL/Functional Activities 3. Decreased Transfer Abilities 4. Decreased Ambulation Ability/Technique 5. Decreased Balance 6. Increased Pain 7. Decreased Activity Tolerance 8. Increased Fatigue 9. Increased Shortness of Breath 10. Decreased Flexibility/Joint Mobility 11. Decreased [place] with Home Exercise Program INTERVENTIONS PLANNED: 
1. Balance Exercise 2. Bed Mobility 3. Cold 4. Cryotherapy 5. Electrical Stimulation 6. Family Education 7. Gait Training 8. Heat 9. Home Exercise Program (HEP) 10. Manual Therapy 11. Neuromuscular Re-education/Strengthening 12. Range of Motion (ROM) 13. Therapeutic Activites 14. Therapeutic Exercise/Strengthening 15. Transfer Training 16. Lumbar Traction TREATMENT PLAN: 
Effective Dates: 12/11/19 TO 2/9/2020 (60 days). Frequency/Duration: 2 times a week for 60 Days GOALS: (Goals have been discussed and agreed upon with patient.) Short Term Goals 30 days 1. Maciej Chicas will be independent with HEP to maintain functional gains made with therapy intervention. 2. Robertn Juan Francisco will participate in core stabilization exercises to help with stabilization during ADLs to decreased pain and improve body mechanics. 3. Maciej Cargo will improve knee extension in sitting by greater than or equal 10 degrees to promote dynamic balance and decrease the risk for falls. 4. Pt to complete 10 min of consecutive standing in order to take a shower with <=3/10 LBP Long Term Goals 60 days 1. Robertn Juan Francisco will demonstrate a 10 point improvement on the Oswestry to show improvement in function and participation in household chores and prepare for return to work. 2. Maciej Chicas will report 0/10 pain at rest and during ADLs 3. Prasanthlon Cargo will demonstrate 4/5 hip abductor strength on manual muscle testing to promote stance limb control with gait and stair negotiation to prevent low back shear. [facility] will demonstrate appropriate lifting technique from floor to waist level with 15lbs and no cues from therapist to complete duties at work 5. Pt will complete 10 min of treadmill walking in order to prepare for return to work and promote postural control. Rehabilitation Potential For Stated Goals: GOOD Outcome Measure: Tool Used: Modified Oswestry Low Back Pain Questionnaire Score:  Initial: 27/50  Most Recent: X/50 (Date: -- ) Interpretation of Score: Each section is scored on a 0-5 scale, 5 representing the greatest disability. The scores of each section are added together for a total score of 50. Medical Necessity:  
· Skilled intervention continues to be required due to above deficits affecting participation in basic ADLs and overall functional tolerance. Reason for Services/Other Comments: 
· Patient continues to require skilled intervention due to  above deficits affecting participation in basic ADLs and overall functional tolerance. Total Duration: 45 minutes plus treatment PT Patient Time In/Time Out Time In: 0930 Time Out: 1025 Regarding Adia Cordova's therapy, I certify that the treatment plan above will be carried out by a therapist or under their direction. Thank you for this referral, Chaitanya Hampton PT Referring Physician Signature: Kimberly Guerra MD              Date HISTORY:  
History of Present Injury/Illness (Reason for Referral): 
 
Pt reports onset of LBP on left SIJ side after acquiring bronchitis and forceful coughing. Pt denies N/T. Pain with coughing and sneezing, however pain stays localized. Pt reports constant pain that is mildly relieved with sidelying. Long history of facet injections. Note: . Patient denies any saddle paresthesia or bowel/bladder deficits.  
 
· Aggravating factors: sneezing and coughing, rolling, walking and stading up straight for long periods of time, sitting for long periods, bending and squatting down to floor to  objects, issues going up/down stairs · Relieving factors: sidelying on the left, heat,  
 
-Present symptoms/complaints (on day of evaluation) Pain Scale: · Current: 6-7/10 · Best: 6/10 · Worst: 10/10 Level of Function/Work/Activity: 
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 Previous Treatment Approaches: 
None Past Medical History/Comorbidities:  
Ms. Moe Mack  has a past medical history of Kidney stone. Ms. Moe Mack  has a past surgical history that includes hx gyn; hx orthopaedic; hx back surgery Feb 2012; and hx hysterectomy. DDD, spondylo thesis, history of sciatica Social History/Living Environment:  
Pt lives in a townhouse with 12-13 steps to enter with 1 handrails. Pt lives with  Dominant Side: Left handed Other Clinical Tests: 
Imaging: NO Active Ambulatory Problems Diagnosis Date Noted  Gastroesophageal reflux disease 12/03/2019  Hypothyroidism 12/03/2019  Osteoarthritis of knee 12/03/2019  Osteopenia 12/03/2019  Smoker 12/03/2019  Low back pain 12/03/2019 Resolved Ambulatory Problems Diagnosis Date Noted  No Resolved Ambulatory Problems Past Medical History:  
Diagnosis Date  Kidney stone Ambulatory/Rehab Services H2 Model Falls Risk Assessment Risk Factors: 
     No Risk Factors Identified Ability to Rise from Chair: 
     (0)  Ability to rise in a single movement Falls Prevention Plan: No modifications necessary Total: (5 or greater = High Risk): 0  
©2010 [facility] [place] Patent #[id number]. Federal Law prohibits the replication, distribution or use without written permission from [facility] Current Medications:   
 Tylenol and Mortin Date Last Reviewed:  12/11/2019 Number of Personal Factors/Comorbidities that affect the Plan of Care: 0: LOW COMPLEXITY EXAMINATION:  
Observation/Orthostatic Postural Assessment:   
      Standing: Forward Head 
Rounded Shoulders Sitting: Forward Head 
Rounded Shoulders increased right wt shift Palpation and Joint Mobility: 
      Paraspinal tenderness noted left > right, Sacroiliac tenderness noted left side joint line, L piriformis and gemellus tenderness ROM:      
AROM/PROM Joint: Eval Date: 12/11/19  Re-Assess Date:   Re-Assess Date: Active ROM RIGHT LEFT RIGHT LEFT RIGHT LEFT Hip Flexion unrestricted unrestricted Hip Extension unrestricted unrestricted Hip Internal Rotation unrestricted unrestricted Knee Flexion unrestricted unrestricted Knee Extension unrestricted unrestricted Lumbar Flexion 58dg Lumbar Extension 15dg Lumbar Side-bending 25 dg 25 dg Lumbar Rotation Strength:   
 Eval Date: 12/11/19  Re-Assess Date:  Re-Assess Date:   
  RIGHT LEFT RIGHT LEFT RIGHT LEFT Knee Flexion (L5-S2) Unable to test secondary to pain Unable to test secondary to pain      
Knee Extension (L3, L4) 3-/5 3-/5 Hip Flexion (L1, L2) 4/5 4/5 Hip Extension Hip Abduction (L5, S1) 3/5 3+/5 Hip External Rotation 4+/5 4/5 Ankle Dorsiflexion  4+/5 4+/5 Strength:  0-5 scale, 0 no muscle contraction; 1 no joint motion but contraction felt; 2 -less than full ROM in gravity eliminated; 2 full ROM in grav eliminated; 2+ full ROM in grav eliminated and omari to withstand minimal resist; 3-less than full ROM against gravity; 3 full ROM against gravity;  3+ full ROM against gravity and able to withstand minimal resist; 4- full ROM against gravity and able to withstand less than mod resist; 4 full ROM against gravity and able to withstand mod resist; 5 full ROM against gravity and able to withstand max resist.  
 
            Deep squat: Unable to complete secondary to pain with wt shift off of left side. Hamstring length from 90/90: 
 RIGHT LE: 45dg LEFT LE: 55 dg Special Tests:  
· ALMA: negative · SLR (<60 degrees): negative · SLUMP: L LE knee extension 60 dfg, R knee LE extension: 65 dg · Neurological Screen:  
 RADIATING SYMPTOMS?: NO, dermatomes intact Functional Mobility:  Affecting participation in basic ADLs and functional tasks. Gait/Ambulation:  antalgic Body Structures Involved: 1. Bones 2. Joints 3. Muscles 4. Ligaments Body Functions Affected: 1. Sensory/Pain 2. Neuromusculoskeletal 
3. Movement Related Activities and Participation Affected: 1. Mobility 2. Self Care Number of elements that affect the Plan of Care: 1-2: LOW COMPLEXITY CLINICAL PRESENTATION:  
Presentation: Stable and uncomplicated: LOW COMPLEXITY CLINICAL DECISION MAKING:  
  
Use of outcome tool(s) and clinical judgement create a POC that gives a: Clear prediction of patient's progress: LOW COMPLEXITY

## 2019-12-11 NOTE — PROGRESS NOTES
Regina Abbott  : 1965  Primary: Tika Chicas [facility]*  Secondary:  [facility] Tyjenny Shelby [address] [place] [address] [address]. [address], [address] [facility]  [phone]   [phone]    Visit Count:  1   OUTPATIENT PHYSICAL THERAPY:  Daily Treatment Note  2019   ICD-10: Treatment Diagnosis: Low back pain (M54.5)                             Sciatica, left side (M54.32)     Muscle weakness (generalized) (M62.81)  Difficulty in walking, not elsewhere classified (R26.2)     Precautions/Allergies:   Patient has no known allergies. MD Orders: Eval and Treat MEDICAL/REFERRING DIAGNOSIS:  Chronic midline low back pain with bilateral sciatica [M54.41, M54.42, G89.29]   DATE OF ONSET: 2019  REFERRING PHYSICIAN: Charly Andersen MD  RETURN PHYSICIAN APPOINTMENT: TBD by patient       Pre-treatment Symptoms/Complaints:  Please see eval  Pain: Initial:   6/10 Post Session:  -6/10   Medications Last Reviewed:  19  Updated Objective Findings:  please see eval     TREATMENT:     THERAPEUTIC EXERCISE: (15 minutes):  Exercises per grid below to improve mobility, strength and coordination. Required minimal visual, verbal, manual and tactile cues to promote proper body alignment and promote proper body mechanics. Progressed resistance, range, repetitions and complexity of movement as indicated. Date:  2019   Date:   Date:     Activity/Exercise Parameters Parameters Parameters   Education  Therapist educates pt on importance of movement and encourages pt to start walking for 3-5 min at a time to promote healing on low back and decrease pain     Single knee to chest 10 x 10 sec     Piriformis stretch 2 x 30 sec     Sciatic nerve glide 10 x                            MANUAL THERAPY: (0 minutes): Joint mobilization and Soft tissue mobilization was utilized and necessary because of the patient's restricted joint motion and painful spasm.      [employer] Portal  Treatment/Session Summary:    · Response to Treatment:  pt has relief of LBP with flexion bias positions. · Communication/Consultation:  eval sent to MD  · Equipment provided today:  HEP to complete at home        Recommendations/Intent for next treatment session: Next visit will focus on walking program, hip hinges, and cat/camel.         Treatment Plan of Care Effective Dates:  12/11/19 to 2/9/19        Total Treatment Billable Duration:  10 min plus eval  PT Patient Time In/Time Out  Time In: 0930  Time Out: 1025  Gisela Villa PT    Future Appointments   Date Time Provider Christina Martinez   12/17/2019  8:00 AM Samira Iqbal PT [facility]   12/19/2019  4:15 PM Samira Iqbal PT [facility]   2/17/2020 11:15 AM Claudean Hausen, MD [facility]

## 2019-12-17 ENCOUNTER — HOSPITAL ENCOUNTER (OUTPATIENT)
Dept: PHYSICAL THERAPY | Age: 54
Discharge: HOME | End: 2019-12-17
Payer: COMMERCIAL

## 2019-12-17 PROCEDURE — 97110 THERAPEUTIC EXERCISES: CPT

## 2019-12-17 NOTE — PROGRESS NOTES
Benja Jarrell  : 1965  Primary: Caye Copper [facility]*  Secondary:  [facility] Tyjenny Shelby [address]  [facility], [address]  [phone]   [phone]    Visit Count:  2   OUTPATIENT PHYSICAL THERAPY:  Daily Treatment Note  2019   ICD-10: Treatment Diagnosis: Low back pain (M54.5)                             Sciatica, left side (M54.32)     Muscle weakness (generalized) (M62.81)  Difficulty in walking, not elsewhere classified (R26.2)     Precautions/Allergies:   Patient has no known allergies. MD Orders: Eval and Treat MEDICAL/REFERRING DIAGNOSIS:   Chronic midline low back pain with bilateral sciatica   DATE OF ONSET: 2019  REFERRING PHYSICIAN: Kimberly Guerra MD  RETURN PHYSICIAN APPOINTMENT: TBD by patient       Pre-treatment Symptoms/Complaints: \" I have gone back to work. I worked 4, 8 hour shifts. \"  Pain: Initial:   5/10 dull ache Post Session:  3-4/10 dull ache   Medications Last Reviewed:  19  Updated Objective Findings:  please see eval     TREATMENT:     THERAPEUTIC EXERCISE: (40 minutes):  Exercises per grid below to improve mobility, strength and coordination. Required minimal visual, verbal, manual and tactile cues to promote proper body alignment and promote proper body mechanics. Progressed resistance, range, repetitions and complexity of movement as indicated.    Date:  2019   Date:  19 Date:     Activity/Exercise Parameters Parameters Parameters   Education  Therapist educates pt on importance of movement and encourages pt to start walking for 3-5 min at a time to promote healing on low back and decrease pain     Single knee to chest 10 x 10 sec 10 x 10 sec    Piriformis stretch 2 x 30 sec 10 x 10 sec    Sciatic nerve glide (left/right) 10 x  10 x    Treadmill walking 2.0 mph  8 min  RPE 3-4  Progress to incline 2     Cat/camel  10 x    Bridge with resisted hip abduction  Blue band  2 x 10 reps    Clams Blue band  3 x 10 reps    Karen pose  10 x        MANUAL THERAPY: (0 minutes): Joint mobilization and Soft tissue mobilization was utilized and necessary because of the patient's restricted joint motion and painful spasm. [employer] Portal  Treatment/Session Summary:    · Response to Treatment:  Pt is quick to anticipate pain prior to an exercise is initiated increasing a sympathetic response. Pt with a decrease in pain with activity and responds well to flexion bias stretches to decrease stiffness. · Communication/Consultation:  None today  · Equipment provided today:  HEP to complete at home        Recommendations/Intent for next treatment session: Next visit will focus on walking program, hip hinges, and lifting and carrying tasks.         Treatment Plan of Care Effective Dates:  12/11/19 to 2/9/19        Total Treatment Billable Duration: 40 min  PT Patient Time In/Time Out  Time In: 0805  Time Out: 0845  Oscar Morrison PT    Future Appointments   Date Time Provider Christina Martinez   12/19/2019  4:15 PM Alanna Moore PT [facility]   12/24/2019  8:00 AM Alanna Moore PT [facility]   12/26/2019  8:45 AM Alanna Moore PT [facility]   2/17/2020 11:15 AM Kristin Carbajal MD [facility]

## 2019-12-19 ENCOUNTER — HOSPITAL ENCOUNTER (OUTPATIENT)
Dept: PHYSICAL THERAPY | Age: 54
Discharge: HOME | End: 2019-12-19
Payer: COMMERCIAL

## 2019-12-19 NOTE — PROGRESS NOTES
Arlen العلي  : 1965  Primary: Altamease Mike [facility]*  Secondary:  [facility] [place] [address], [address]  [phone]   [phone]        OUTPATIENT DAILY NOTE    NAME/AGE/GENDER: Arlen العلي is a 53 y.o. female. DATE: 2019    Ms. Cordova  Cancelled appt today.      Michaela Gamboa, PT

## 2019-12-24 ENCOUNTER — HOSPITAL ENCOUNTER (OUTPATIENT)
Dept: PHYSICAL THERAPY | Age: 54
Discharge: HOME | End: 2019-12-24
Payer: COMMERCIAL

## 2019-12-24 PROCEDURE — 97110 THERAPEUTIC EXERCISES: CPT

## 2019-12-24 NOTE — PROGRESS NOTES
Harleyville Schaumann  : 1965  Primary: Rosalba Peter Of [facility]*  Secondary:  [facility] [address], [address] [facility]  [phone]   [phone]    Visit Count:  3   OUTPATIENT PHYSICAL THERAPY:  Daily Treatment Note  2019   ICD-10: Treatment Diagnosis: Low back pain (M54.5)                             Sciatica, left side (M54.32)     Muscle weakness (generalized) (M62.81)  Difficulty in walking, not elsewhere classified (R26.2)     Precautions/Allergies:   Patient has no known allergies. MD Orders: Eval and Treat MEDICAL/REFERRING DIAGNOSIS:   Chronic midline low back pain with bilateral sciatica   DATE OF ONSET: 2019  REFERRING PHYSICIAN: Cecilia Lemon MD  RETURN PHYSICIAN APPOINTMENT: TBD by patient       Pre-treatment Symptoms/Complaints: \" I have been a hot mess. I have an upper respiratory stuff going The ache in my back is always there. \"  Pain: Initial:   5/10 dull ache Post Session:  -3/10 dull ache   Medications Last Reviewed:  19  Updated Objective Findings:  None today     TREATMENT:     THERAPEUTIC EXERCISE: (45 minutes):  Exercises per grid below to improve mobility, strength and coordination. Required minimal visual, verbal, manual and tactile cues to promote proper body alignment and promote proper body mechanics. Progressed resistance, range, repetitions and complexity of movement as indicated.    Date:  2019   Date:  19 Date:  29   Activity/Exercise Parameters Parameters Parameters   Education  Therapist educates pt on importance of movement and encourages pt to start walking for 3-5 min at a time to promote healing on low back and decrease pain     Single knee to chest 10 x 10 sec 10 x 10 sec    Piriformis stretch 2 x 30 sec 10 x 10 sec    Sciatic nerve glide (left/right) 10 x  10 x    Treadmill walking 2.0 mph  8 min  RPE 3-4  Progress to incline 2  2.4 mph  10 min  RPE 3-4  Progress to incline 2   Cat/camel  10 x 10 x   Bridge with resisted hip abduction  Blue band  2 x 10 reps    Clams  Blue band  3 x 10 reps    Karen pose  10 x 10 x    Squat   15lb kettle bell  6in surface  2 x 10 reps   Sit to stand   18in chair  5lb bar  2 x 10 reps   [place] carry   8lb  3 x 150ft   Thread the needle stretch   10 x 10 sec holds   Dynamic Hamstring stretch   Plantigrade on floor  15 x             MANUAL THERAPY: (0 minutes): Joint mobilization and Soft tissue mobilization was utilized and necessary because of the patient's restricted joint motion and painful spasm. [employer] Portal  Treatment/Session Summary:    · Response to Treatment:  Pt is able to carry over child's pose and cat camel stretches without cues from therapist. Therapist educates pt on pain alarm system and how anticipating pain triggers sympathetic nervous system. · Communication/Consultation:  None today  · Equipment provided today:  HEP to complete at home        Recommendations/Intent for next treatment session: Next visit will focus on walking program, hip hinges, and lifting and carrying tasks.         Treatment Plan of Care Effective Dates:  12/11/19 to 2/9/19        Total Treatment Billable Duration: 45 min  PT Patient Time In/Time Out  Time In: 0800  Time Out: 0845  Abel Hernandez PT    Future Appointments   Date Time Provider Christina Martinez   12/26/2019  8:45 AM Gunjan Chua PT [facility]   2/17/2020 11:15 AM Tad Mansfield MD [facility]

## 2019-12-26 ENCOUNTER — HOSPITAL ENCOUNTER (OUTPATIENT)
Dept: PHYSICAL THERAPY | Age: 54
Discharge: HOME | End: 2019-12-26
Payer: COMMERCIAL

## 2019-12-26 PROCEDURE — 97110 THERAPEUTIC EXERCISES: CPT

## 2019-12-26 NOTE — PROGRESS NOTES
Jamshid Yessy  : 1965  Primary: Sandrine Toribio Of Damaris Farnsworth*  Secondary:  [facility] yTjenny Shelby [address]  [address]. [address], [address]  [phone]   [phone]    Visit Count:  4   OUTPATIENT PHYSICAL THERAPY:  Daily Treatment Note  2019   ICD-10: Treatment Diagnosis: Low back pain (M54.5)                             Sciatica, left side (M54.32)     Muscle weakness (generalized) (M62.81)  Difficulty in walking, not elsewhere classified (R26.2)     Precautions/Allergies:   Patient has no known allergies. MD Orders: Eval and Treat MEDICAL/REFERRING DIAGNOSIS:   Chronic midline low back pain with bilateral sciatica   DATE OF ONSET: 2019  REFERRING PHYSICIAN: Zkai Mitchell MD  RETURN PHYSICIAN APPOINTMENT: TBD by patient       Pre-treatment Symptoms/Complaints: \" I have been a hot mess. I have an upper respiratory stuff going The ache in my back is always there. \"  Pain: Initial:   5/10 dull ache pain in Low back Post Session:  3-4/10 dull ache   Medications Last Reviewed:  19  Updated Objective Findings:  None today     TREATMENT:     THERAPEUTIC EXERCISE: (45 minutes):  Exercises per grid below to improve mobility, strength and coordination. Required minimal visual, verbal, manual and tactile cues to promote proper body alignment and promote proper body mechanics. Progressed resistance, range, repetitions and complexity of movement as indicated.    Date:  2019   Date:  19 Date:  29 Date:  19   Activity/Exercise Parameters Parameters Parameters    Education  Therapist educates pt on importance of movement and encourages pt to start walking for 3-5 min at a time to promote healing on low back and decrease pain      Single knee to chest 10 x 10 sec 10 x 10 sec     Piriformis stretch 2 x 30 sec 10 x 10 sec     Sciatic nerve glide (left/right) 10 x  10 x     Treadmill walking 2.0 mph  8 min  RPE 3-4  Progress to incline 2 2.4 mph  10 min  RPE 3-4  Progress to incline 2 2.4 mph  10 min  RPE 3-4  Progress to incline 2   Cat/camel  10 x 10 x 10x   Bridge with resisted hip abduction  Blue band  2 x 10 reps     Clams  Blue band  3 x 10 reps     Karen pose  10 x 10 x  10 x   Squat   15lb kettle bell  6in surface  2 x 10 reps 15lb kettle bell  6in surface  2 x 10 reps   Sit to stand   18in chair  5lb bar  2 x 10 reps 18in chair  5lb bar  3 x 10 reps   [place] carry   8lb  3 x 150ft 8 lbs   Thread the needle stretch   10 x 10 sec holds 10 x 10 sec holds   Dynamic Hamstring stretch   Plantigrade on floor  15 x Plantigrade on 16 in  15 x   Seated forward flexion    10x   Deadlift    6in block  10 lbs  5 x 5 reps       MANUAL THERAPY: (0 minutes): Joint mobilization and Soft tissue mobilization was utilized and necessary because of the patient's restricted joint motion and painful spasm. [facility] Portal  Treatment/Session Summary:    · Response to Treatment:  Pt with ability to carry over lifting mechanics with dead lift and squats that will assist with work releated tasks at [facility]. Pt is able to manage muscle stiffness and discomfort with use of stretches. · Communication/Consultation:  None today  · Equipment provided today:  None today        Recommendations/Intent for next treatment session: Next visit will focus on walking program, hip hinges, and lifting and carrying tasks.         Treatment Plan of Care Effective Dates:  12/11/19 to 2/9/19        Total Treatment Billable Duration: 45 min  PT Patient Time In/Time Out  Time In: 4422  Time Out: 0921  Minna Riojas PT    Future Appointments   Date Time Provider Christina Martinez   2/17/2020 11:15 AM Thania Colby MD [facility]

## 2019-12-30 ENCOUNTER — HOSPITAL ENCOUNTER (OUTPATIENT)
Dept: PHYSICAL THERAPY | Age: 54
Discharge: HOME | End: 2019-12-30
Payer: COMMERCIAL

## 2019-12-30 PROCEDURE — 97110 THERAPEUTIC EXERCISES: CPT

## 2019-12-30 NOTE — PROGRESS NOTES
Devin Zurita  : 1965  Primary: Edmund Almaraz Of Damaris Farnsworth*  Secondary:  [facility]  [address]. [address], [address]  [phone]   [phone]    Visit Count:  5   OUTPATIENT PHYSICAL THERAPY:  Daily Treatment Note  2019   ICD-10: Treatment Diagnosis: Low back pain (M54.5)                             Sciatica, left side (M54.32)     Muscle weakness (generalized) (M62.81)  Difficulty in walking, not elsewhere classified (R26.2)     Precautions/Allergies:   Patient has no known allergies. MD Orders: Eval and Treat MEDICAL/REFERRING DIAGNOSIS:   Chronic midline low back pain with bilateral sciatica   DATE OF ONSET: 2019  REFERRING PHYSICIAN: Coco Altamirano MD  RETURN PHYSICIAN APPOINTMENT: TBD by patient       Pre-treatment Symptoms/Complaints: \" I had a rough time at work yesterday. \"  Pain: Initial:   1-2/10 dull ache pain in Low back Post Session:  1-2/10 dull ache   Medications Last Reviewed:  19  Updated Objective Findings:  None today     TREATMENT:     THERAPEUTIC EXERCISE: (45 minutes):  Exercises per grid below to improve mobility, strength and coordination. Required minimal visual, verbal, manual and tactile cues to promote proper body alignment and promote proper body mechanics. Progressed resistance, range, repetitions and complexity of movement as indicated.    Date:  2019   Date:  19 Date:  29 Date:  19 Date:  19   Activity/Exercise Parameters Parameters Parameters     Education  Therapist educates pt on importance of movement and encourages pt to start walking for 3-5 min at a time to promote healing on low back and decrease pain       Single knee to chest 10 x 10 sec 10 x 10 sec      Piriformis stretch 2 x 30 sec 10 x 10 sec      Sciatic nerve glide (left/right) 10 x  10 x      Treadmill walking 2.0 mph  8 min  RPE 3-4  Progress to incline 2  2.4 mph  10 min  RPE 3-4  Progress to incline 2 2.4 mph  10 min  RPE 3-4  Progress to incline 2 2.4 mph  10 min  RPE 3-4  Progress to incline 2 to 3   Cat/camel  10 x 10 x 10x 10 x   Bridge with resisted hip abduction  Blue band  2 x 10 reps      Clams  Blue band  3 x 10 reps      Karen pose  10 x 10 x  10 x 10x   Squat   15lb kettle bell  6in surface  2 x 10 reps 15lb kettle bell  6in surface  2 x 10 reps    Sit to stand   18in chair  5lb bar  2 x 10 reps 18in chair  5lb bar  3 x 10 reps    [place] carry   8lb  3 x 150ft 8 lbs 8lbs  3 x 150ft   Thread the needle stretch   10 x 10 sec holds 10 x 10 sec holds 10 x 10 sec holds   Dynamic Hamstring stretch   Plantigrade on floor  15 x Plantigrade on 16 in  15 x Plantigrade on 16 in  15 x   Seated forward flexion    10x    Deadlift    6in block  10 lbs  5 x 5 reps 6in block  10 lbs  5 x 5 reps   LTR with reach across with LE     10x 5sec hold left/right   Resisted side stepping     yellow band  3 x 12 ft   Happy baby stretch     10 x       MANUAL THERAPY: (0 minutes): Joint mobilization and Soft tissue mobilization was utilized and necessary because of the patient's restricted joint motion and painful spasm. [facility] Portal  Treatment/Session Summary:    · Response to Treatment:  Pt exhibits improved activity tolerance noted by increased incline on treadmill and no pain in low back. Pt demos ability to safely complete stretches and carry over into HEP. Pt demos improved body mechanics awareness and thoracolumbar coordination with deadlifts. · Communication/Consultation:  None today  · Equipment provided today:  HEP to asdd resisted side stepping        Recommendations/Intent for next treatment session: Next visit will focus on initiating planks on incline for core stability and strengthening.         Treatment Plan of Care Effective Dates:  12/11/19 to 2/9/19        Total Treatment Billable Duration: 45 min  PT Patient Time In/Time Out  Time In: 0900  Time Out: 0945  Kristina Hightower, PT    Future Appointments   Date Time Provider Christina Martinez   1/3/2020  9:30 AM Gae Lesches, PT [facility] AND [facility]   2/17/2020 11:15 AM Suzy Segovia MD [facility]

## 2020-01-03 ENCOUNTER — HOSPITAL ENCOUNTER (OUTPATIENT)
Dept: PHYSICAL THERAPY | Age: 55
Discharge: HOME | End: 2020-01-03
Payer: SELF-PAY

## 2020-01-03 PROCEDURE — 97110 THERAPEUTIC EXERCISES: CPT

## 2020-01-03 NOTE — PROGRESS NOTES
Yessica Michelle  : 1965  Primary: Sugar Yap Of Damaris Farnsworth*  Secondary:  [facility] [place] [address]. [address], [address]  [phone]   [phone]    Visit Count:  6   OUTPATIENT PHYSICAL THERAPY:  Daily Treatment Note  1/3/2020   ICD-10: Treatment Diagnosis: Low back pain (M54.5)                             Sciatica, left side (M54.32)     Muscle weakness (generalized) (M62.81)  Difficulty in walking, not elsewhere classified (R26.2)     Precautions/Allergies:   Patient has no known allergies. MD Orders: Eval and Treat MEDICAL/REFERRING DIAGNOSIS:   Chronic midline low back pain with bilateral sciatica   DATE OF ONSET: 2019  REFERRING PHYSICIAN: Alfred Conner MD  RETURN PHYSICIAN APPOINTMENT: TBD by patient       Pre-treatment Symptoms/Complaints: \" I had a rough time at work and have been having some issues with pain when I cough really hard. \"  Pain: Initial:   5-6/10 dull ache pain in Low back Post Session:  0/10 dull ache   Medications Last Reviewed:  19  Updated Objective Findings:  None today     TREATMENT:     THERAPEUTIC EXERCISE: (53 minutes):  Exercises per grid below to improve mobility, strength and coordination. Required minimal visual, verbal, manual and tactile cues to promote proper body alignment and promote proper body mechanics. Progressed resistance, range, repetitions and complexity of movement as indicated.    Date:  2019   Date:  19 Date:  29 Date:  19 Date:  19 Date:  1/3/20   Activity/Exercise Parameters Parameters Parameters      Education  Therapist educates pt on importance of movement and encourages pt to start walking for 3-5 min at a time to promote healing on low back and decrease pain        Single knee to chest 10 x 10 sec 10 x 10 sec       Piriformis stretch 2 x 30 sec 10 x 10 sec       Sciatic nerve glide (left/right) 10 x  10 x       Treadmill walking 2.0 mph  8 min  RPE 3-4  Progress to incline 2  2.4 mph  10 min  RPE 3-4  Progress to incline 2 2.4 mph  10 min  RPE 3-4  Progress to incline 2 2.4 mph  10 min  RPE 3-4  Progress to incline 2 to 3 2.4 mph  10 min  RPE 3-4  Progress to incline 3   Cat/camel  10 x 10 x 10x 10 x 15x   Bridge with resisted hip abduction  Blue band  2 x 10 reps       Clams  Blue band  3 x 10 reps       Karen pose  10 x 10 x  10 x 10x 15x   Squat   15lb kettle bell  6in surface  2 x 10 reps 15lb kettle bell  6in surface  2 x 10 reps     Sit to stand   18in chair  5lb bar  2 x 10 reps 18in chair  5lb bar  3 x 10 reps     [place] carry   8lb  3 x 150ft 8 lbs 8lbs  3 x 150ft    Thread the needle stretch   10 x 10 sec holds 10 x 10 sec holds 10 x 10 sec holds    Dynamic Hamstring stretch   Plantigrade on floor  15 x Plantigrade on 16 in  15 x Plantigrade on 16 in  15 x Plantigrade on 16 in  15 x   Seated forward flexion    10x     Deadlift    6in block  10 lbs  5 x 5 reps 6in block  10 lbs  5 x 5 reps    LTR with reach across with LE     10x 5sec hold left/right 2 min, dynamic   Resisted side stepping     yellow band  3 x 12 ft    Happy baby stretch     10 x 15 x   Quadruped ant/post weight shift      15 x   Quadruped CW, CCW circles      15 x   Open book stretch (left/right)      2 min, dynamic   Scorpion Stretch and roll (left/right)      2 min   Modified plank      16 in surface  3 x 30 sec       MANUAL THERAPY: (0 minutes): Joint mobilization and Soft tissue mobilization was utilized and necessary because of the patient's restricted joint motion and painful spasm. [facility] Portal  Treatment/Session Summary:    · Response to Treatment:  Pt responds well to dynamic movement focused on multi-joint stretching. Pt is able to transition through rolling, long sitting, quadruped, and plantigrade positions with improved smoothness and no pain by end of session.   · Communication/Consultation:  Therapist provides skilled education on importance of movement to decrease inflammation and promote joint mobility, increase blood flow, and lubrication of joints. · Equipment provided today:  None today. Recommendations/Intent for next treatment session: Next visit will focus on initiating planks on incline for core stability and strengthening.         Treatment Plan of Care Effective Dates:  12/11/19 to 2/9/19        Total Treatment Billable Duration: 53 min  PT Patient Time In/Time Out  Time In: 0935  Time Out: Alysa AMADOR 1711, PT    Future Appointments   Date Time Provider Christina Martinez   1/6/2020  8:00 AM Prasanna Barrett, PT [facility] AND [facility]   1/7/2020  8:00 AM Prasanna Barrett PT [facility]   2/17/2020 11:15 AM Maribel Fan MD [facility]

## 2020-01-06 ENCOUNTER — HOSPITAL ENCOUNTER (OUTPATIENT)
Dept: PHYSICAL THERAPY | Age: 55
Discharge: HOME | End: 2020-01-06
Payer: SELF-PAY

## 2020-01-06 NOTE — PROGRESS NOTES
Zulay Alejandro  : 1965  Primary: Nancy Malhotra Of Damaris Linnetterisa*  Secondary:  [facility] Dylan Ryan [address] [place] [address] [address]. [address], [address]  [phone]   [phone]        OUTPATIENT DAILY NOTE    NAME/AGE/GENDER: Zulay Alejandro is a 53 y.o. female. DATE: 2020    Ms. Mg Solders for today's appointment due to car issues. Tim Etienne, PT

## 2020-01-07 ENCOUNTER — HOSPITAL ENCOUNTER (OUTPATIENT)
Dept: PHYSICAL THERAPY | Age: 55
Discharge: HOME | End: 2020-01-07
Payer: SELF-PAY

## 2020-01-07 PROCEDURE — 97110 THERAPEUTIC EXERCISES: CPT

## 2020-01-07 NOTE — PROGRESS NOTES
Galina Millerton  : 1965  Primary: Kita Scott Of Damaris Farnsworth*  Secondary:  [facility] Dylan Shelby [facility]. [address], [address]  [phone]   [phone]    Visit Count:  7   OUTPATIENT PHYSICAL THERAPY:  Daily Treatment Note  2020   ICD-10: Treatment Diagnosis: Low back pain (M54.5)                             Sciatica, left side (M54.32)     Muscle weakness (generalized) (M62.81)  Difficulty in walking, not elsewhere classified (R26.2)     Precautions/Allergies:   Patient has no known allergies. MD Orders: Eval and Treat MEDICAL/REFERRING DIAGNOSIS:   Chronic midline low back pain with bilateral sciatica   DATE OF ONSET: 2019  REFERRING PHYSICIAN: Clayton Mathur MD  RETURN PHYSICIAN APPOINTMENT: TBD by patient       Pre-treatment Symptoms/Complaints: \"I felt okay after last time. \"  Pain: Initial:   610 aching/soreness in sacral region Post Session:  0/10    Medications Last Reviewed:  19  Updated Objective Findings:  None today     TREATMENT:     THERAPEUTIC EXERCISE: (40 minutes):  Exercises per grid below to improve mobility, strength and coordination. Required minimal visual, verbal, manual and tactile cues to promote proper body alignment and promote proper body mechanics. Progressed resistance, range, repetitions and complexity of movement as indicated.    Date:  2019   Date:  19 Date:  29 Date:  19 Date:  19 Date:  1/3/20 Date:  20   Activity/Exercise Parameters Parameters Parameters       Education  Therapist educates pt on importance of movement and encourages pt to start walking for 3-5 min at a time to promote healing on low back and decrease pain         Single knee to chest 10 x 10 sec 10 x 10 sec        Piriformis stretch 2 x 30 sec 10 x 10 sec        Sciatic nerve glide (left/right) 10 x  10 x        Treadmill walking 2.0 mph  8 min  RPE 3-4  Progress to incline 2  2.4 mph  10 min  RPE 3-4  Progress to incline 2 2.4 mph  10 min  RPE 3-4  Progress to incline 2 2.4 mph  10 min  RPE 3-4  Progress to incline 2 to 3 2.4 mph  10 min  RPE 3-4  Progress to incline 3 2.4 mph  12 min  RPE 3-4  Progress to incline 3   Cat/camel  10 x 10 x 10x 10 x 15x    Bridge with resisted hip abduction  Blue band  2 x 10 reps        Clams  Blue band  3 x 10 reps        Karen pose  10 x 10 x  10 x 10x 15x 20x   Squat   15lb kettle bell  6in surface  2 x 10 reps 15lb kettle bell  6in surface  2 x 10 reps   15lb  2 x 10 reps   Sit to stand   18in chair  5lb bar  2 x 10 reps 18in chair  5lb bar  3 x 10 reps      [place] carry   8lb  3 x 150ft 8 lbs 8lbs  3 x 150ft     Thread the needle stretch   10 x 10 sec holds 10 x 10 sec holds 10 x 10 sec holds  15x   Dynamic Hamstring stretch   Plantigrade on floor  15 x Plantigrade on 16 in  15 x Plantigrade on 16 in  15 x Plantigrade on 16 in  15 x Plantigrade on 16 in  15 x   Seated forward flexion    10x      Deadlift    6in block  10 lbs  5 x 5 reps 6in block  10 lbs  5 x 5 reps     LTR with reach across with LE     10x 5sec hold left/right 2 min, dynamic 3 min, dynamic   Resisted side stepping     yellow band  3 x 12 ft     Happy baby stretch     10 x 15 x 15x   Quadruped ant/post weight shift      15 x 15x   Quadruped CW, CCW circles      15 x 15x   Open book stretch (left/right)      2 min, dynamic 3 min, dynamic stretch   Scorpion Stretch and roll (left/right)      2 min 3 min   Modified plank      16 in surface  3 x 30 sec        [facility] Portal  Treatment/Session Summary:    · Response to Treatment:  Pt is highly guarded today and requires maximum encouragement to avoid negative thought process and breath holding to decrease sympathetic response and perpetuate chronic pain and pain cycle. · Communication/Consultation:  None today. · Equipment provided today:  None today.         Recommendations/Intent for next treatment session: Next visit will focus on initiating planks on incline for core stability and strengthening.         Treatment Plan of Care Effective Dates:  12/11/19 to 2/9/19      Total Treatment Billable Duration: 40 min  PT Patient Time In/Time Out  Time In: 0805  Time Out: 0845  Anne Carmen PT    Future Appointments   Date Time Provider Christina Martinez   1/15/2020  8:00 AM Mami Hope, PT [facility]   1/17/2020  8:45 AM Mami Hope PT [facility]   2/17/2020 11:15 AM Geoff Ramírez MD [facility]

## 2020-01-15 ENCOUNTER — HOSPITAL ENCOUNTER (OUTPATIENT)
Dept: PHYSICAL THERAPY | Age: 55
Discharge: HOME | End: 2020-01-15
Payer: SELF-PAY

## 2020-01-15 PROCEDURE — 97110 THERAPEUTIC EXERCISES: CPT

## 2020-01-15 NOTE — PROGRESS NOTES
Gerald Romberg  : 1965  Primary: Kunal Smith Of Damaris Farnsworth*  Secondary:  [facility] [address]  [facility], [address]  [phone]   [phone]    Visit Count:  8   OUTPATIENT PHYSICAL THERAPY:  Daily Treatment Note  1/15/2020   ICD-10: Treatment Diagnosis: Low back pain (M54.5)                             Sciatica, left side (M54.32)     Muscle weakness (generalized) (M62.81)  Difficulty in walking, not elsewhere classified (R26.2)     Precautions/Allergies:   Patient has no known allergies. MD Orders: Eval and Treat MEDICAL/REFERRING DIAGNOSIS:   Chronic midline low back pain with bilateral sciatica   DATE OF ONSET: 2019  REFERRING PHYSICIAN: King Maxx MD  RETURN PHYSICIAN APPOINTMENT: TBD by patient       Pre-treatment Symptoms/Complaints: \"I have had some issues this past week and I am having a lot of discomfort today. \"   Pain: Initial:   10 aching/soreness in sacral region Post Session:  010    Medications Last Reviewed:  19  Updated Objective Findings:  None today     TREATMENT:     THERAPEUTIC EXERCISE: (45 minutes):  Exercises per grid below to improve mobility, strength and coordination. Required minimal visual, verbal, manual and tactile cues to promote proper body alignment and promote proper body mechanics. Progressed resistance, range, repetitions and complexity of movement as indicated.    Date:  2019   Date:  19 Date:  29 Date:  19 Date:  19 Date:  1/3/20 Date:  20 Date:  1/15/20   Activity/Exercise Parameters Parameters Parameters        Education  Therapist educates pt on importance of movement and encourages pt to start walking for 3-5 min at a time to promote healing on low back and decrease pain          Single knee to chest 10 x 10 sec 10 x 10 sec         Piriformis stretch 2 x 30 sec 10 x 10 sec         Sciatic nerve glide (left/right) 10 x  10 x         Treadmill walking 2.0 mph  8 min  RPE 3-4  Progress to incline 2  2.4 mph  10 min  RPE 3-4  Progress to incline 2 2.4 mph  10 min  RPE 3-4  Progress to incline 2 2.4 mph  10 min  RPE 3-4  Progress to incline 2 to 3 2.4 mph  10 min  RPE 3-4  Progress to incline 3 2.4 mph  12 min  RPE 3-4  Progress to incline 3 2.2 mph  12 min  Incline 2   Cat/camel  10 x 10 x 10x 10 x 15x  2 x 10 reps   Bridge with resisted hip abduction  Blue band  2 x 10 reps         Clams  Blue band  3 x 10 reps         Karen pose  10 x 10 x  10 x 10x 15x 20x 2 x 10 reps   Squat   15lb kettle bell  6in surface  2 x 10 reps 15lb kettle bell  6in surface  2 x 10 reps   15lb  2 x 10 reps 10 lb  2 c 10 reps   Sit to stand   18in chair  5lb bar  2 x 10 reps 18in chair  5lb bar  3 x 10 reps       [place] carry   8lb  3 x 150ft 8 lbs 8lbs  3 x 150ft      Thread the needle stretch   10 x 10 sec holds 10 x 10 sec holds 10 x 10 sec holds  15x 2 x 10 reps   Dynamic Hamstring stretch   Plantigrade on floor  15 x Plantigrade on 16 in  15 x Plantigrade on 16 in  15 x Plantigrade on 16 in  15 x Plantigrade on 16 in  15 x Plantigrade on 16 in  15 x   Seated forward flexion    10x       Deadlift    6in block  10 lbs  5 x 5 reps 6in block  10 lbs  5 x 5 reps      LTR with reach across with LE     10x 5sec hold left/right 2 min, dynamic 3 min, dynamic 3 min, dynamic   Resisted side stepping     yellow band  3 x 12 ft      Happy baby stretch     10 x 15 x 15x 15x   Quadruped ant/post weight shift      15 x 15x 15x   Quadruped CW, CCW circles      15 x 15x 15x   Open book stretch (left/right)      2 min, dynamic 3 min, dynamic stretch 3 min, dynamic stretch   Scorpion Stretch and roll (left/right)      2 min 3 min 3 min   Modified plank      16 in surface  3 x 30 sec     Diagonal with upward reach        10lb, each side  2 x 10 reps       [facility] Portal  Treatment/Session Summary:    · Response to Treatment:  Pt requires increased encouragement and time for stretching to progress to functional lifting activities today secondary to increased pain level and stiffness per patient. Pt requires no cues for lifting technique today and able to complete 27 consecutive reps prior to a brief rest breaks. · Communication/Consultation:    · Equipment provided today:  None today. Recommendations/Intent for next treatment session: Next visit will focus on initiating planks on incline for core stability and strengthening in functional contexts.         Treatment Plan of Care Effective Dates:  12/11/19 to 2/9/19      Total Treatment Billable Duration: 45 min  PT Patient Time In/Time Out  Time In: 0800  Time Out: 0845  Catrachita Pastrana PT    Future Appointments   Date Time Provider Christina Martinez   1/17/2020  8:45 AM Ellen Loza, PT [facility] AND [facility]   2/17/2020 11:15 AM Joe Thurman MD [facility]

## 2020-01-17 ENCOUNTER — HOSPITAL ENCOUNTER (OUTPATIENT)
Dept: PHYSICAL THERAPY | Age: 55
Discharge: HOME | End: 2020-01-17
Payer: SELF-PAY

## 2020-01-17 PROCEDURE — 97110 THERAPEUTIC EXERCISES: CPT

## 2020-01-17 NOTE — PROGRESS NOTES
Jacoby Pao  : 1965  Primary: [facility]  Secondary:  [facility]  [address]. [address], [address]  [phone]   [phone]    Visit Count:  9   OUTPATIENT PHYSICAL THERAPY:  Daily Treatment Note  2020   ICD-10: Treatment Diagnosis: Low back pain (M54.5)                             Sciatica, left side (M54.32)     Muscle weakness (generalized) (M62.81)  Difficulty in walking, not elsewhere classified (R26.2)     Precautions/Allergies:   Patient has no known allergies. MD Orders: Eval and Treat MEDICAL/REFERRING DIAGNOSIS:   Chronic midline low back pain with bilateral sciatica   DATE OF ONSET: 2019  REFERRING PHYSICIAN: Neli Hyde MD  RETURN PHYSICIAN APPOINTMENT: TBD by patient       Pre-treatment Symptoms/Complaints: \"I AM HAVING A LOUSY DAY TODAY\"   Pain: Initial:   6/10 aching/soreness in sacral region Post Session:  010    Medications Last Reviewed:  19  Updated Objective Findings:  None today     TREATMENT:     THERAPEUTIC EXERCISE: (45 minutes):  Exercises per grid below to improve mobility, strength and coordination. Required minimal visual, verbal, manual and tactile cues to promote proper body alignment and promote proper body mechanics. Progressed resistance, range, repetitions and complexity of movement as indicated.    Date:  2019   Date:  19 Date:  29 Date:  19 Date:  19 Date:  1/3/20 Date:  20 Date:  1/15/20 Date:  20   Activity/Exercise Parameters Parameters Parameters         Education  Therapist educates pt on importance of movement and encourages pt to start walking for 3-5 min at a time to promote healing on low back and decrease pain           Single knee to chest 10 x 10 sec 10 x 10 sec       5 x 10 sec   Piriformis stretch 2 x 30 sec 10 x 10 sec          Sciatic nerve glide (left/right) 10 x  10 x          Treadmill walking 2.0 mph  8 min  RPE 3-4  Progress to incline 2  2.4 mph  10 min  RPE 3-4  Progress to incline 2 2.4 mph  10 min  RPE 3-4  Progress to incline 2 2.4 mph  10 min  RPE 3-4  Progress to incline 2 to 3 2.4 mph  10 min  RPE 3-4  Progress to incline 3 2.4 mph  12 min  RPE 3-4  Progress to incline 3 2.2 mph  12 min  Incline 2 2.2 mph  10 min  Incline 2   RPE 4   Cat/camel  10 x 10 x 10x 10 x 15x  2 x 10 reps    Bridge with resisted hip abduction  Blue band  2 x 10 reps          Clams  Blue band  3 x 10 reps          Karen pose  10 x 10 x  10 x 10x 15x 20x 2 x 10 reps 15x   Squat   15lb kettle bell  6in surface  2 x 10 reps 15lb kettle bell  6in surface  2 x 10 reps   15lb  2 x 10 reps 10 lb  2 c 10 reps    Sit to stand   18in chair  5lb bar  2 x 10 reps 18in chair  5lb bar  3 x 10 reps        Fox carry   8lb  3 x 150ft 8 lbs 8lbs  3 x 150ft    7lb and 10 lb  4 x 150ft   Thread the needle stretch   10 x 10 sec holds 10 x 10 sec holds 10 x 10 sec holds  15x 2 x 10 reps 15x   Dynamic Hamstring stretch   Plantigrade on floor  15 x Plantigrade on 16 in  15 x Plantigrade on 16 in  15 x Plantigrade on 16 in  15 x Plantigrade on 16 in  15 x Plantigrade on 16 in  15 x    Seated forward flexion    10x        Deadlift    6in block  10 lbs  5 x 5 reps 6in block  10 lbs  5 x 5 reps       LTR with reach across with LE     10x 5sec hold left/right 2 min, dynamic 3 min, dynamic 3 min, dynamic 3 min,  dynamic   Resisted side stepping     yellow band  3 x 12 ft       Happy baby stretch     10 x 15 x 15x 15x 15x   Quadruped ant/post weight shift      15 x 15x 15x 15x   Quadruped CW, CCW circles      15 x 15x 15x 15x   Open book stretch (left/right)      2 min, dynamic 3 min, dynamic stretch 3 min, dynamic stretch 3 min, dynamic stretch   Scorpion Stretch and roll (left/right)      2 min 3 min 3 min 3min progressed to frog roll and long sitting   Modified plank      16 in surface  3 x 30 sec      Diagonal with upward reach        10lb, each side  2 x 10 reps 10 lb  10x each side   Long sitting pec stretch with cervical ranges         3 min   Overhead reach         7lb  15x   Tray Carry         10 lb  2 x 150ft       [employer] Portal  Treatment/Session Summary:    · Response to Treatment:  Pt is able to complete overhead and downward reach exericses with weight and maintain good spinal alignment and squat form. Pt responds well to dynamic stretching to decrease neural and muscular tension to allow movement with less pain. · Communication/Consultation: None today    · Equipment provided today:  None today. Recommendations/Intent for next treatment session: Next visit will focus on planks on incline for core stability and strengthening in functional contexts.         Treatment Plan of Care Effective Dates:  12/11/19 to 2/9/19      Total Treatment Billable Duration: 45 min  PT Patient Time In/Time Out  Time In: 0845  Time Out: 0930  Emily Suazo PT    Future Appointments   Date Time Provider Christina Martinez   1/20/2020  8:00 AM Dawit Ulrich PT [facility]   1/23/2020  2:45 PM Dawit Ulrich PT [facility]   2/17/2020 11:15 AM Kadeem Martin MD [facility]

## 2020-01-20 ENCOUNTER — HOSPITAL ENCOUNTER (OUTPATIENT)
Dept: PHYSICAL THERAPY | Age: 55
Discharge: HOME | End: 2020-01-20
Payer: SELF-PAY

## 2020-01-20 PROCEDURE — 97110 THERAPEUTIC EXERCISES: CPT

## 2020-01-20 NOTE — PROGRESS NOTES
Jacoby Pao  : 1965  Primary: Portolaprakash Dinh [facility]*  Secondary:  [facility]  [address]. [address], [address]  [phone]   [phone]    Visit Count:  10   OUTPATIENT PHYSICAL THERAPY:  Daily Treatment Note  2020   ICD-10: Treatment Diagnosis: Low back pain (M54.5)                             Sciatica, left side (M54.32)     Muscle weakness (generalized) (M62.81)  Difficulty in walking, not elsewhere classified (R26.2)     Precautions/Allergies:   Patient has no known allergies. MD Orders: Eval and Treat MEDICAL/REFERRING DIAGNOSIS:   Chronic midline low back pain with bilateral sciatica   DATE OF ONSET: 2019  REFERRING PHYSICIAN: Neli Hyde MD  RETURN PHYSICIAN APPOINTMENT: TBD by patient       Pre-treatment Symptoms/Complaints: \"I did pretty well after last time. Just a little sore. \"  Pain: Initial:   6/10 aching/soreness in sacral region Post Session:  0/10    Medications Last Reviewed:  19  Updated Objective Findings:  None today     TREATMENT:     THERAPEUTIC EXERCISE: (45 minutes):  Exercises per grid below to improve mobility, strength and coordination. Required minimal visual, verbal, manual and tactile cues to promote proper body alignment and promote proper body mechanics. Progressed resistance, range, repetitions and complexity of movement as indicated.    Date:  29 Date:  19 Date:  19 Date:  1/3/20 Date:  20 Date:  1/15/20 Date:  20 Date:  20   Activity/Exercise Parameters          Education            Single knee to chest       5 x 10 sec    Treadmill walking 2.4 mph  10 min  RPE 3-4  Progress to incline 2 2.4 mph  10 min  RPE 3-4  Progress to incline 2 2.4 mph  10 min  RPE 3-4  Progress to incline 2 to 3 2.4 mph  10 min  RPE 3-4  Progress to incline 3 2.4 mph  12 min  RPE 3-4  Progress to incline 3 2.2 mph  12 min  Incline 2 2.2 mph  10 min  Incline 2   RPE 4 2.2 mph  12 min  Incline 2   RPE 4   Cat/camel 10 x 10x 10 x 15x  2 x 10 reps     Karen pose 10 x  10 x 10x 15x 20x 2 x 10 reps 15x    Squat 15lb kettle bell  6in surface  2 x 10 reps 15lb kettle bell  6in surface  2 x 10 reps   15lb  2 x 10 reps 10 lb  2 c 10 reps     Sit to stand 18in chair  5lb bar  2 x 10 reps 18in chair  5lb bar  3 x 10 reps         [place] carry 8lb  3 x 150ft 8 lbs 8lbs  3 x 150ft    7lb and 10 lb  4 x 150ft 10lb  3 x 150ft   Thread the needle stretch 10 x 10 sec holds 10 x 10 sec holds 10 x 10 sec holds  15x 2 x 10 reps 15x    Dynamic Hamstring stretch Plantigrade on floor  15 x Plantigrade on 16 in  15 x Plantigrade on 16 in  15 x Plantigrade on 16 in  15 x Plantigrade on 16 in  15 x Plantigrade on 16 in  15 x  Plantigrade on 16 in  15 x   Seated forward flexion  10x         Deadlift  6in block  10 lbs  5 x 5 reps 6in block  10 lbs  5 x 5 reps        LTR with reach across with LE   10x 5sec hold left/right 2 min, dynamic 3 min, dynamic 3 min, dynamic 3 min,  dynamic    Resisted side stepping   yellow band  3 x 12 ft        Happy baby stretch   10 x 15 x 15x 15x 15x 15x   Quadruped ant/post weight shift    15 x 15x 15x 15x 15x   Quadruped CW, CCW circles    15 x 15x 15x 15x 15x   Open book stretch (left/right)    2 min, dynamic 3 min, dynamic stretch 3 min, dynamic stretch 3 min, dynamic stretch 3min progressed to frog roll and long sitting   Scorpion Stretch and roll (left/right)    2 min 3 min 3 min 3min progressed to frog roll and long sitting 3min progressed to frog roll and long sitting   Modified plank    16 in surface  3 x 30 sec    16 in   3 x 45 sec holds   Diagonal with upward reach      10lb, each side  2 x 10 reps 10 lb  10x each side 10 lb  2 x 10 reps  Each side   Long sitting pec stretch with cervical ranges       3 min    Overhead reach       7lb  15x 10 lb  10 x   Tray Carry       10 lb  2 x 150ft 10 lb  3 x 150ft       [employer] Portal  Treatment/Session Summary:    · Response to Treatment:  Pt reports no back or LE pain throughout entire session. Pt generalizes core control into various functional movements required for work and household chores. Pt requires minimal cues for exercises indicating good adherence to HEP at home. · Communication/Consultation: None today    · Equipment provided today:  None today. Recommendations/Intent for next treatment session: assess progress toward goals next visit for d/c and review HEP as needed.          Treatment Plan of Care Effective Dates:  12/11/19 to 2/9/19      Total Treatment Billable Duration: 45 min  PT Patient Time In/Time Out  Time In: 0800  Time Out: 0845  Janice Lockhart PT    Future Appointments   Date Time Provider Christina Martinez   1/23/2020  2:45 PM Valerie Andre PT [facility]   2/17/2020 11:15 AM Alfred Conner MD [facility]

## 2020-01-23 ENCOUNTER — HOSPITAL ENCOUNTER (OUTPATIENT)
Dept: PHYSICAL THERAPY | Age: 55
Discharge: HOME | End: 2020-01-23
Payer: SELF-PAY

## 2020-01-23 PROCEDURE — 97110 THERAPEUTIC EXERCISES: CPT

## 2020-01-23 NOTE — THERAPY DISCHARGE
Vanessa Sutton  : 1965      Payor: Barron Trujillo / Plan: [employer] / Product Type: PPO /  [address] [facility] at [address] [place] [address]. [address] [place] [address]  [phone]   [phone]       OUTPATIENT PHYSICAL THERAPY:Discharge 2020    ICD-10: Treatment Diagnosis:    Low back pain (M54.5)        Sciatica, left side (M54.32)     Muscle weakness (generalized) (M62.81)  Difficulty in walking, not elsewhere classified (R26.2)     Precautions/Allergies:   Patient has no known allergies. MD Orders: Eval and Treat  MEDICAL/REFERRING DIAGNOSIS:   Chronic midline low back pain with bilateral sciatica   DATE OF ONSET: 2019  REFERRING PHYSICIAN: Jose Juan Andrea MD  RETURN PHYSICIAN APPOINTMENT: TBD by patient     INITIAL ASSESSMENT:  Ms. Vanessa Sutton has met 4/4 STGs and 4/5 LTGs over course of therapy intervention focused on increasing LE core strength, development of walking program, and HEP to focus on stretching and functional movements for strengthening. Pt is able to walk on treadmill for 10 to 12 minutes with no increase in pain and no radicular symptoms have been reported in the past 3 weeks. Pt has returned to work and reports ability to bend and lift objects at work and home with improved strength, balance, and coordination with no LBP. Pt does have low back pain and stiffness at the end of the day or in the morning that she is able to manage through stretching and strengthening routine developed by therapist as HEP. Pt no longer requires skilled therapy intervention. PT POC closed. TREATMENT PLAN:  Effective Dates: 19 TO 2020 (60 days). Frequency/Duration: 2 times a week for 60 Days  GOALS: (Goals have been discussed and agreed upon with patient.)  Short Term Goals 30 days   1.  Vanessa Sutton will be independent with HEP to maintain functional gains made with therapy intervention.- Met 1/23/20  2. Alonso Laoo will participate in core stabilization exercises to help with stabilization during ADLs to decreased pain and improve body mechanics- Met 1/23/20.  3. Alonso Laoo will improve knee extension in sitting by greater than or equal 10 degrees to promote dynamic balance and decrease the risk for falls. - Met 1/23/20  4. Pt to complete 10 min of consecutive standing in order to take a shower with <=3/10 LBP - Met 1/23/20    Long Term Goals 60 days  1. Alonso Laoo will demonstrate a 10 point improvement on the Oswestry to show improvement in function and participation in household chores and prepare for return to work. - Met 1/23/20  2. Alonso Laoo will report 0/10 pain at rest and during ADLs - Not Met, pt reports 3-4/10 pain  3. Alonso Laoo will demonstrate 4/5 hip abductor strength on manual muscle testing to promote stance limb control with gait and stair negotiation to prevent low back shear.- Met 1/23/20   4. Alonso Laoo will demonstrate appropriate lifting technique from floor to waist level with 15lbs and no cues from therapist to complete duties at work- Met 1/23/20  5. Pt will complete 10 min of treadmill walking in order to prepare for return to work and promote postural control.- MET 1/23/20, pt exceeds and able to walk x 15 min     Outcome Measure: Tool Used: Modified Oswestry Low Back Pain Questionnaire  Score:  Initial: 27/50  Most Recent: 9/50 (Date: 1/23/20 )   Interpretation of Score: Each section is scored on a 0-5 scale, 5 representing the greatest disability. The scores of each section are added together for a total score of 50. Total Duration: 45 min, see daily note for details  PT Patient Time In/Time Out  Time In: 1350  Time Out: 1530      Thank you for this referral,  Swati Dennis, PT     Referring Physician Signature:  Jodi Deal MD No signature required            EXAMINATION:     ROM:       AROM/PROM       Joint: Eval Date: 12/11/19 Re-Assess Date:  1/23/20   Active ROM RIGHT LEFT RIGHT LEFT   Hip Flexion unrestricted unrestricted     Hip Extension unrestricted unrestricted     Hip Internal Rotation unrestricted unrestricted     Knee Flexion unrestricted unrestricted     Knee Extension unrestricted unrestricted     Lumbar Flexion 58dg  NT NT   Lumbar Extension 15dg  NT NT   Lumbar Side-bending 25 dg 25 dg NT NT   Lumbar Rotation         Strength:     Eval Date: 12/11/19  Re-Assess Date: 1/23/20     RIGHT LEFT RIGHT LEFT   Knee Flexion (L5-S2) Unable to test secondary to pain Unable to test secondary to pain 4+/5 4+/5   Knee Extension (L3, L4) 3-/5 3-/5 5/5 5/5   Hip Flexion (L1, L2) 4/5 4/5 4+/5 4+/5   Hip Extension       Hip Abduction (L5, S1) 3/5 3+/5 4+/5 4+/5   Hip External Rotation 4+/5 4/5 5/5 5/5   Ankle Dorsiflexion  4+/5 4+/5 5/5 5/5   Strength:  0-5 scale, 0 no muscle contraction; 1 no joint motion but contraction felt; 2 -less than full ROM in gravity eliminated; 2 full ROM in grav eliminated; 2+ full ROM in grav eliminated and omari to withstand minimal resist; 3-less than full ROM against gravity; 3 full ROM against gravity;  3+ full ROM against gravity and able to withstand minimal resist; 4- full ROM against gravity and able to withstand less than mod resist; 4 full ROM against gravity and able to withstand mod resist; 5 full ROM against gravity and able to withstand max resist.                 Deep squat: Unable to complete secondary to pain with wt shift off of left side.  1/23/20 : pt able to complete a full functional squat and lift 15 lbs

## 2020-01-23 NOTE — PROGRESS NOTES
Chema Hewitt  : 1965  Primary: Bingpauline Stevens [facility]*  Secondary:  [facility] Carloslui [facility] [address]  [address] [place] [address] Drive. [address], [address] [facility]  [phone]   [phone]    Visit Count:  11   OUTPATIENT PHYSICAL THERAPY:  Daily Treatment Note  2020   ICD-10: Treatment Diagnosis: Low back pain (M54.5)                             Sciatica, left side (M54.32)     Muscle weakness (generalized) (M62.81)  Difficulty in walking, not elsewhere classified (R26.2)     Precautions/Allergies:   Patient has no known allergies. MD Orders: Eval and Treat MEDICAL/REFERRING DIAGNOSIS:   Chronic midline low back pain with bilateral sciatica   DATE OF ONSET: 2019  REFERRING PHYSICIAN: Gonzalo Chaudhry MD  RETURN PHYSICIAN APPOINTMENT: TBD by patient       Pre-treatment Symptoms/Complaints: \" I am able to do exercises at home and do well with them. \"  Pain: Initial:   3-410 aching/soreness in sacral region Post Session:  0/10    Medications Last Reviewed:  19  Updated Objective Findings:  None today     TREATMENT:     THERAPEUTIC EXERCISE: ( 45 minutes):  Exercises per grid below to improve mobility, strength and coordination. Required minimal visual, verbal, manual and tactile cues to promote proper body alignment and promote proper body mechanics. Progressed resistance, range, repetitions and complexity of movement as indicated. Date:  29 Date:  19 Date:  19 Date:  1/3/20 Date:  20 Date:  1/15/20 Date:  20 Date:  20 Date:  20   Activity/Exercise Parameters           Education          1. Admin and scoring of Oswestry  2. Assessment of MMT of LE    Single knee to chest       5 x 10 sec     Treadmill walking 2.4 mph  10 min  RPE 3-4  Progress to incline 2 2.4 mph  10 min  RPE 3-4  Progress to incline 2 2.4 mph  10 min  RPE 3-4  Progress to incline 2 to 3 2.4 mph  10 min  RPE 3-4  Progress to incline 3 2.4 mph  12 min  RPE 3-4  Progress to incline 3 2.2 mph  12 min  Incline 2 2.2 mph  10 min  Incline 2   RPE 4 2.2 mph  12 min  Incline 2   RPE 4 2.2 mph  12 min  Incline 2   RPE 4   Cat/camel 10 x 10x 10 x 15x  2 x 10 reps   2 x 10 reps   Karen pose 10 x  10 x 10x 15x 20x 2 x 10 reps 15x  2 x 10 reps   Squat 15lb kettle bell  6in surface  2 x 10 reps 15lb kettle bell  6in surface  2 x 10 reps   15lb  2 x 10 reps 10 lb  2 x 10 reps   15 lbs  2 x 10 reps   Sit to stand 18in chair  5lb bar  2 x 10 reps 18in chair  5lb bar  3 x 10 reps          [place] carry 8lb  3 x 150ft 8 lbs 8lbs  3 x 150ft    7lb and 10 lb  4 x 150ft 10lb  3 x 150ft    Thread the needle stretch 10 x 10 sec holds 10 x 10 sec holds 10 x 10 sec holds  15x 2 x 10 reps 15x     Dynamic Hamstring stretch Plantigrade on floor  15 x Plantigrade on 16 in  15 x Plantigrade on 16 in  15 x Plantigrade on 16 in  15 x Plantigrade on 16 in  15 x Plantigrade on 16 in  15 x  Plantigrade on 16 in  15 x Plantigrade  15x on 16 in surface   Seated forward flexion  10x          Deadlift  6in block  10 lbs  5 x 5 reps 6in block  10 lbs  5 x 5 reps         LTR with reach across with LE   10x 5sec hold left/right 2 min, dynamic 3 min, dynamic 3 min, dynamic 3 min,  dynamic  3 min dynamic   Resisted side stepping   yellow band  3 x 12 ft         Happy baby stretch   10 x 15 x 15x 15x 15x 15x 15x   Quadruped ant/post weight shift    15 x 15x 15x 15x 15x 15x   Quadruped CW, CCW circles    15 x 15x 15x 15x 15x    Open book stretch (left/right)    2 min, dynamic 3 min, dynamic stretch 3 min, dynamic stretch 3 min, dynamic stretch 3min progressed to frog roll and long sitting    Scorpion Stretch and roll (left/right)    2 min 3 min 3 min 3min progressed to frog roll and long sitting 3min progressed to frog roll and long sitting    Modified plank    16 in surface  3 x 30 sec    16 in   3 x 45 sec holds    Diagonal with upward reach      10lb, each side  2 x 10 reps 10 lb  10x each side 10 lb  2 x 10 reps  Each side    Long sitting pec stretch with cervical ranges       3 min     Overhead reach       7lb  15x 10 lb  10 x    Tray Carry       10 lb  2 x 150ft 10 lb  3 x 150ft        [employer] [facility]  Treatment/Session Summary:    · Response to Treatment:  Pt has met all STGS and 4/5 LTGs. Pt no longer requires skilled therapy intervention. See d/c summary for details. · Communication/Consultation: None today    · Equipment provided today:  None today. Recommendations/Intent for next treatment session: d/c with HEP. .     Treatment Plan of Care Effective Dates:  12/11/19 to 2/9/19    Total Treatment Billable Duration: 45 min  PT Patient Time In/Time Out  Time In: 1350  Time Out: Mariano 70, PT    Future Appointments   Date Time Provider Christina Martinez   2/17/2020 11:15 AM Muna Powell MD [facility]

## 2024-10-22 NOTE — LETTER
[address] [address] EMERGENCY DEPT 
[address] [facility] [address] 
[phone] Work/School Note Date: 11/19/2019 To Whom It May concern: 
 
Lucy Blanco was seen and treated today in the emergency room by the following provider(s): 
Attending Provider: Garrett Nieto may return to work on Saturday November 23rd 2019. Sincerely, Josseline Jaquez RN 
 
 
 
 Yes